# Patient Record
Sex: FEMALE | Race: BLACK OR AFRICAN AMERICAN
[De-identification: names, ages, dates, MRNs, and addresses within clinical notes are randomized per-mention and may not be internally consistent; named-entity substitution may affect disease eponyms.]

---

## 2019-08-26 ENCOUNTER — HOSPITAL ENCOUNTER (OUTPATIENT)
Dept: HOSPITAL 47 - RADUSWWP | Age: 69
Discharge: HOME | End: 2019-08-26
Attending: FAMILY MEDICINE
Payer: MEDICARE

## 2019-08-26 DIAGNOSIS — R60.0: Primary | ICD-10-CM

## 2019-08-26 PROCEDURE — 93970 EXTREMITY STUDY: CPT

## 2019-08-26 NOTE — US
EXAMINATION TYPE: US venous doppler duplex LE 

 

DATE OF EXAM: 8/26/2019 1:43 PM

 

COMPARISON: NONE

 

CLINICAL HISTORY: Bilateral Edema R60.0. Bilateral ankle swelling x many years per patient.

 

SIDE PERFORMED: Bilateral  

 

TECHNIQUE:  The lower extremity deep venous system is examined utilizing real time linear array sonog
waqar with graded compression, doppler sonography and color-flow sonography.

 

VESSELS IMAGED:

 

Common Femoral Vein

Deep Femoral Vein

Greater Saphenous Vein *

Femoral Vein

Popliteal Vein

Small Saphenous Vein *

Proximal Calf Veins

(* superficial vessels)

 

Grayscale, color doppler, spectral doppler imaging performed of the deep veins of the lower extremiti
es.  There is normal flow, compressibility, vascular waveforms.

 

Us exam is technically limited by large body habitus at greater than 250lbs and HT 5'4".

 

Right Leg:  Negative for DVT as technologist was able to visualize vessels

 

Left Leg:  Negative for DVT as technologist was able to visualize vessels

 

Edema channels are noted at bilateral popliteal fossa level and ankle level.

 

IMPRESSION: Limited examination secondary to lower extremity edema and patient body habitus. No gross
 evidence of deep venous thrombosis within the visualized lower extremities.

## 2020-10-12 ENCOUNTER — HOSPITAL ENCOUNTER (OUTPATIENT)
Dept: HOSPITAL 47 - RADMRIMAIN | Age: 70
Discharge: HOME | End: 2020-10-12
Attending: FAMILY MEDICINE
Payer: MEDICARE

## 2020-10-12 DIAGNOSIS — G93.89: Primary | ICD-10-CM

## 2020-10-12 PROCEDURE — 70553 MRI BRAIN STEM W/O & W/DYE: CPT

## 2020-10-12 NOTE — MR
EXAMINATION TYPE: MR brain wo/w con

 

DATE OF EXAM: 10/12/2020

 

COMPARISON: None

 

HISTORY: Tremors, weakness, memory loss

 

TECHNIQUE: 

Multiplanar, multisequence images of the brain and brainstem is performed without and with IV contras
t, utilizing 9.5 mL intravenous Gadavist .

 

FINDINGS: 

Diffusion weighted images demonstrate no evidence of a recent infarct or other diffusion abnormality.
  Moderate nonspecific periventricular and subcortical white matter T2 FLAIR hyperintense foci, which
 demonstrate no evidence of restricted diffusion or postcontrast enhancement. 

 

Mild diffuse volume loss. The ventricular system and cisternal spaces are normal in size and appearan
ce.  There is no extra-axial fluid collection.  

 

Midline structures demonstrate normal morphology.  The craniocervical junction appears within normal 
limits.  Post contrast images demonstrate no abnormal enhancement. The dural venous sinuses appear pa
tent. The visualized sinuses are clear and the globes are grossly symmetric.

 

IMPRESSION: 

1. Mild diffuse volume loss.

2. Moderate nonspecific T2 FLAIR hyperintense foci, likely represent sequela of chronic small vessel 
disease.

## 2022-04-13 ENCOUNTER — HOSPITAL ENCOUNTER (INPATIENT)
Dept: HOSPITAL 47 - EC | Age: 72
LOS: 9 days | Discharge: NURSING, REHAB OR LONG-TERM CARE | DRG: 329 | End: 2022-04-22
Attending: HOSPITALIST | Admitting: HOSPITALIST
Payer: MEDICARE

## 2022-04-13 VITALS — BODY MASS INDEX: 45.7 KG/M2

## 2022-04-13 DIAGNOSIS — E78.5: ICD-10-CM

## 2022-04-13 DIAGNOSIS — I16.0: ICD-10-CM

## 2022-04-13 DIAGNOSIS — K65.8: ICD-10-CM

## 2022-04-13 DIAGNOSIS — Z20.822: ICD-10-CM

## 2022-04-13 DIAGNOSIS — Z87.891: ICD-10-CM

## 2022-04-13 DIAGNOSIS — M19.91: ICD-10-CM

## 2022-04-13 DIAGNOSIS — I48.19: ICD-10-CM

## 2022-04-13 DIAGNOSIS — Z90.710: ICD-10-CM

## 2022-04-13 DIAGNOSIS — K56.7: ICD-10-CM

## 2022-04-13 DIAGNOSIS — J44.9: ICD-10-CM

## 2022-04-13 DIAGNOSIS — Z79.899: ICD-10-CM

## 2022-04-13 DIAGNOSIS — K55.9: ICD-10-CM

## 2022-04-13 DIAGNOSIS — G30.9: ICD-10-CM

## 2022-04-13 DIAGNOSIS — K43.0: Primary | ICD-10-CM

## 2022-04-13 DIAGNOSIS — D72.829: ICD-10-CM

## 2022-04-13 DIAGNOSIS — D62: ICD-10-CM

## 2022-04-13 DIAGNOSIS — E87.6: ICD-10-CM

## 2022-04-13 DIAGNOSIS — F41.9: ICD-10-CM

## 2022-04-13 DIAGNOSIS — Z79.82: ICD-10-CM

## 2022-04-13 DIAGNOSIS — R18.8: ICD-10-CM

## 2022-04-13 DIAGNOSIS — E16.2: ICD-10-CM

## 2022-04-13 DIAGNOSIS — G47.00: ICD-10-CM

## 2022-04-13 DIAGNOSIS — F02.80: ICD-10-CM

## 2022-04-13 DIAGNOSIS — E83.42: ICD-10-CM

## 2022-04-13 DIAGNOSIS — I10: ICD-10-CM

## 2022-04-13 DIAGNOSIS — E66.9: ICD-10-CM

## 2022-04-13 DIAGNOSIS — E87.2: ICD-10-CM

## 2022-04-13 LAB
ALBUMIN SERPL-MCNC: 4.7 G/DL (ref 3.5–5)
ALP SERPL-CCNC: 127 U/L (ref 38–126)
ALT SERPL-CCNC: 16 U/L (ref 4–34)
AMYLASE SERPL-CCNC: 58 U/L (ref 30–110)
ANION GAP SERPL CALC-SCNC: 10 MMOL/L
APTT BLD: 23.5 SEC (ref 22–30)
AST SERPL-CCNC: 32 U/L (ref 14–36)
BASOPHILS # BLD AUTO: 0.1 K/UL (ref 0–0.2)
BASOPHILS NFR BLD AUTO: 1 %
BUN SERPL-SCNC: 14 MG/DL (ref 7–17)
CALCIUM SPEC-MCNC: 9.5 MG/DL (ref 8.4–10.2)
CHLORIDE SERPL-SCNC: 96 MMOL/L (ref 98–107)
CO2 SERPL-SCNC: 30 MMOL/L (ref 22–30)
EOSINOPHIL # BLD AUTO: 0.2 K/UL (ref 0–0.7)
EOSINOPHIL NFR BLD AUTO: 1 %
ERYTHROCYTE [DISTWIDTH] IN BLOOD BY AUTOMATED COUNT: 4.5 M/UL (ref 3.8–5.4)
ERYTHROCYTE [DISTWIDTH] IN BLOOD: 13.4 % (ref 11.5–15.5)
GLUCOSE BLD-MCNC: 128 MG/DL (ref 75–99)
GLUCOSE SERPL-MCNC: 179 MG/DL (ref 74–99)
HCT VFR BLD AUTO: 43 % (ref 34–46)
HGB BLD-MCNC: 13.3 GM/DL (ref 11.4–16)
INR PPP: 1 (ref ?–1.2)
LIPASE SERPL-CCNC: 65 U/L (ref 23–300)
LYMPHOCYTES # SPEC AUTO: 0.7 K/UL (ref 1–4.8)
LYMPHOCYTES NFR SPEC AUTO: 6 %
MCH RBC QN AUTO: 29.6 PG (ref 25–35)
MCHC RBC AUTO-ENTMCNC: 30.9 G/DL (ref 31–37)
MCV RBC AUTO: 95.5 FL (ref 80–100)
MONOCYTES # BLD AUTO: 0.5 K/UL (ref 0–1)
MONOCYTES NFR BLD AUTO: 4 %
NEUTROPHILS # BLD AUTO: 10.8 K/UL (ref 1.3–7.7)
NEUTROPHILS NFR BLD AUTO: 88 %
PH UR: 7.5 [PH] (ref 5–8)
PLATELET # BLD AUTO: 226 K/UL (ref 150–450)
POTASSIUM SERPL-SCNC: 4.4 MMOL/L (ref 3.5–5.1)
PROT SERPL-MCNC: 8.7 G/DL (ref 6.3–8.2)
PROT UR QL: (no result)
PT BLD: 10.6 SEC (ref 9–12)
RBC UR QL: 9 /HPF (ref 0–5)
SODIUM SERPL-SCNC: 136 MMOL/L (ref 137–145)
SP GR UR: 1.01 (ref 1–1.03)
SQUAMOUS UR QL AUTO: <1 /HPF (ref 0–4)
UROBILINOGEN UR QL STRIP: <2 MG/DL (ref ?–2)
WBC # BLD AUTO: 12.3 K/UL (ref 3.8–10.6)
WBC # UR AUTO: <1 /HPF (ref 0–5)

## 2022-04-13 PROCEDURE — 96376 TX/PRO/DX INJ SAME DRUG ADON: CPT

## 2022-04-13 PROCEDURE — 88307 TISSUE EXAM BY PATHOLOGIST: CPT

## 2022-04-13 PROCEDURE — 99285 EMERGENCY DEPT VISIT HI MDM: CPT

## 2022-04-13 PROCEDURE — 82150 ASSAY OF AMYLASE: CPT

## 2022-04-13 PROCEDURE — 83605 ASSAY OF LACTIC ACID: CPT

## 2022-04-13 PROCEDURE — 81001 URINALYSIS AUTO W/SCOPE: CPT

## 2022-04-13 PROCEDURE — 0DB80ZZ EXCISION OF SMALL INTESTINE, OPEN APPROACH: ICD-10-PCS

## 2022-04-13 PROCEDURE — 74022 RADEX COMPL AQT ABD SERIES: CPT

## 2022-04-13 PROCEDURE — 84145 PROCALCITONIN (PCT): CPT

## 2022-04-13 PROCEDURE — 76937 US GUIDE VASCULAR ACCESS: CPT

## 2022-04-13 PROCEDURE — 85025 COMPLETE CBC W/AUTO DIFF WBC: CPT

## 2022-04-13 PROCEDURE — 36410 VNPNXR 3YR/> PHY/QHP DX/THER: CPT

## 2022-04-13 PROCEDURE — 96375 TX/PRO/DX INJ NEW DRUG ADDON: CPT

## 2022-04-13 PROCEDURE — 0WQF0ZZ REPAIR ABDOMINAL WALL, OPEN APPROACH: ICD-10-PCS

## 2022-04-13 PROCEDURE — 74176 CT ABD & PELVIS W/O CONTRAST: CPT

## 2022-04-13 PROCEDURE — 36415 COLL VENOUS BLD VENIPUNCTURE: CPT

## 2022-04-13 PROCEDURE — 96361 HYDRATE IV INFUSION ADD-ON: CPT

## 2022-04-13 PROCEDURE — 83690 ASSAY OF LIPASE: CPT

## 2022-04-13 PROCEDURE — 80053 COMPREHEN METABOLIC PANEL: CPT

## 2022-04-13 PROCEDURE — 83735 ASSAY OF MAGNESIUM: CPT

## 2022-04-13 PROCEDURE — 85730 THROMBOPLASTIN TIME PARTIAL: CPT

## 2022-04-13 PROCEDURE — 74174 CTA ABD&PLVS W/CONTRAST: CPT

## 2022-04-13 PROCEDURE — 85610 PROTHROMBIN TIME: CPT

## 2022-04-13 PROCEDURE — 96365 THER/PROPH/DIAG IV INF INIT: CPT

## 2022-04-13 PROCEDURE — 93306 TTE W/DOPPLER COMPLETE: CPT

## 2022-04-13 PROCEDURE — 84484 ASSAY OF TROPONIN QUANT: CPT

## 2022-04-13 PROCEDURE — 93005 ELECTROCARDIOGRAM TRACING: CPT

## 2022-04-13 PROCEDURE — 0DN80ZZ RELEASE SMALL INTESTINE, OPEN APPROACH: ICD-10-PCS

## 2022-04-13 PROCEDURE — 96366 THER/PROPH/DIAG IV INF ADDON: CPT

## 2022-04-13 PROCEDURE — 85027 COMPLETE CBC AUTOMATED: CPT

## 2022-04-13 PROCEDURE — 74019 RADEX ABDOMEN 2 VIEWS: CPT

## 2022-04-13 PROCEDURE — 80048 BASIC METABOLIC PNL TOTAL CA: CPT

## 2022-04-13 PROCEDURE — 71275 CT ANGIOGRAPHY CHEST: CPT

## 2022-04-13 PROCEDURE — 87635 SARS-COV-2 COVID-19 AMP PRB: CPT

## 2022-04-13 RX ADMIN — HYDROMORPHONE HYDROCHLORIDE ONE MG: 1 INJECTION, SOLUTION INTRAMUSCULAR; INTRAVENOUS; SUBCUTANEOUS at 21:15

## 2022-04-13 RX ADMIN — METOPROLOL TARTRATE SCH MG: 25 TABLET, FILM COATED ORAL at 07:02

## 2022-04-13 RX ADMIN — HYDROMORPHONE HYDROCHLORIDE PRN MG: 1 INJECTION, SOLUTION INTRAMUSCULAR; INTRAVENOUS; SUBCUTANEOUS at 09:21

## 2022-04-13 RX ADMIN — HYDROMORPHONE HYDROCHLORIDE ONE MG: 1 INJECTION, SOLUTION INTRAMUSCULAR; INTRAVENOUS; SUBCUTANEOUS at 21:04

## 2022-04-13 RX ADMIN — HYDROMORPHONE HYDROCHLORIDE PRN MG: 1 INJECTION, SOLUTION INTRAMUSCULAR; INTRAVENOUS; SUBCUTANEOUS at 23:21

## 2022-04-13 RX ADMIN — METOPROLOL TARTRATE SCH: 25 TABLET, FILM COATED ORAL at 23:12

## 2022-04-13 RX ADMIN — MEMANTINE HYDROCHLORIDE SCH MG: 10 TABLET ORAL at 12:33

## 2022-04-13 RX ADMIN — CYANOCOBALAMIN TAB 500 MCG SCH: 500 TAB at 23:00

## 2022-04-13 RX ADMIN — ACETAMINOPHEN SCH: 325 TABLET, FILM COATED ORAL at 23:12

## 2022-04-13 RX ADMIN — LOSARTAN POTASSIUM SCH MG: 50 TABLET, FILM COATED ORAL at 08:55

## 2022-04-13 RX ADMIN — FOLIC ACID SCH: 1 TABLET ORAL at 23:01

## 2022-04-13 RX ADMIN — DICLOFENAC SODIUM SCH: 10 GEL TOPICAL at 23:13

## 2022-04-13 RX ADMIN — DICLOFENAC SODIUM SCH: 10 GEL TOPICAL at 23:10

## 2022-04-13 RX ADMIN — DICLOFENAC SODIUM SCH: 10 GEL TOPICAL at 23:11

## 2022-04-13 RX ADMIN — PRAVASTATIN SODIUM SCH: 20 TABLET ORAL at 23:13

## 2022-04-13 RX ADMIN — CEFAZOLIN SCH: 330 INJECTION, POWDER, FOR SOLUTION INTRAMUSCULAR; INTRAVENOUS at 22:46

## 2022-04-13 RX ADMIN — HEPARIN SODIUM SCH MLS/HR: 10000 INJECTION, SOLUTION INTRAVENOUS at 04:14

## 2022-04-13 RX ADMIN — MEMANTINE HYDROCHLORIDE SCH: 10 TABLET ORAL at 23:01

## 2022-04-13 RX ADMIN — CEFAZOLIN SCH MLS/HR: 330 INJECTION, POWDER, FOR SOLUTION INTRAMUSCULAR; INTRAVENOUS at 05:26

## 2022-04-13 RX ADMIN — HYDROMORPHONE HYDROCHLORIDE ONE MG: 1 INJECTION, SOLUTION INTRAMUSCULAR; INTRAVENOUS; SUBCUTANEOUS at 21:23

## 2022-04-13 RX ADMIN — CEFAZOLIN SCH MLS/HR: 330 INJECTION, POWDER, FOR SOLUTION INTRAMUSCULAR; INTRAVENOUS at 23:23

## 2022-04-13 NOTE — P.OP
Date of Procedure: 04/13/22


Preoperative Diagnosis: 


Small bowel obstruction


Postoperative Diagnosis: 


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


Procedure(s) Performed: 


Exploratory laparotomy





Small bowel resection.  





Repair of incisional hernia


Anesthesia: TRINA


Surgeon: Melquiades Vela


Estimated Blood Loss (ml): 25


Pathology: other (Small bowel)


Condition: critical


Disposition: PACU


Description of Procedure: 


The patient's placed the operative table in the supine position her she received

a general anesthetic.  Her abdomen was prepped and draped in sterile fashion.  

The abdomen was entered through the midline.  Upon entering the pleural cavity. 

There was approximately 1000 mL of sanguinous ascites.  The incision was 

extended inferiorly.  There was a small incisional hernia seen.  The small bowel

was examined.  There appeared to be evidence of a internal hernia with straining

of small bowel.  An adhesive band was found freeing the hernia.  This was lysed 

with sharp dissection.  And the small bowel was released.  The small bowel was 

black.  The small bowel was then transected proximally distally.  With a GI 

stapler.  Then using the Enseal device the mesentery the bowel was divided.  The

specimens of pathology.  A side-to-side functional end-to-end staple anastomosis

was then created using TEJAL and TA stapler.  A 3-0 GI silk sutures across stitch.

 The abdomen was then irrigated with 4 L normal saline.  No bleeding was seen.  

The fascia was closed with looped #1 PDS suture.  2 sutures used to close the 

fascia.  The incisional hernias repaired, fascial closure.  The skin was closed 

staples.  Several Telfa fermin were placed in the subcutaneous tissue.  Patient 

was sent to recovery room in critical condition.

## 2022-04-13 NOTE — XR
EXAMINATION TYPE: XR abdomen 2V

 

DATE OF EXAM: 4/13/2022

 

COMPARISON: 7/2/2010

 

HISTORY: Pain

 

TECHNIQUE: One view abdominal series

 

FINDINGS:  

Bibasilar subsegmental consolidation. Hypertrophic and degenerative change of the spine. Dilated smal
l bowel loops are seen and there is contrast within the right renal collecting system. Contrast in th
e pelvis likely within the bladder. Osteitis pubis condensans.

 

IMPRESSION:

1. Nonspecific abdomen with multiple of prominent small bowel loops seen in the left abdomen. Could b
e on the basis of ileus, enteritis or partial structures.

2. Markedly distended stomach correlate for gastroparesis or gastric outlet obstruction.

3. Bibasilar subsegmental atelectasis or early infiltrate

## 2022-04-13 NOTE — ECHOF
Referral Reason:



MEASUREMENTS

--------

HEIGHT: 162.6 cm

WEIGHT: 104.3 kg

BP: 

IVSd:   1.2 cm     (0.6 - 1.1)

LVIDd:   4.9 cm     (3.9 - 5.3)

LVPWd:   1.1 cm     (0.6 - 1.1)

IVSs:   1.4 cm

LVIDs:   3.1 cm

LVPWs:   1.2 cm

Ao Diam:   3.3 cm     (2.0 - 3.7)

AV Cusp:   2.1 cm     (1.5 - 2.6)

LA Diam:   2.6 cm     (2.7 - 3.8)

MV EXCURSION:   17.007 mm     (> 18.000)

MV EF SLOPE:   78 mm/s     (70 - 150)

MV E Ja:   0.53 m/s

MV DecT:   210 ms

MV A Ja:   1.15 m/s

MV E/A Ratio:   0.46 

RAP:   5.00 mmHg

RVSP:   12.42 mmHg







FINDINGS

--------

This was a technically difficult study with suboptimal views.

The left ventricular size is normal.   There is mild concentric left ventricular hypertrophy.   Overa
ll left ventricular systolic function is normal with, an EF between 55 - 60 %.

The RV was not well visualized.

The left atrial size is normal.

The right atrium was not well visualized.

xx ml of Lumason was utilized for enhancement of images.

The aortic valve was not well visualized.

There is trace mitral regurgitation.

The tricuspid valve appears structurally normal.   Trace tricuspid regurgitation present.   Right marli
tricular systolic pressure is normal at < 35 mmHg.

There is no pulmonic regurgitation present.

The aortic root size is normal.

IVC Not well visulized.



CONCLUSIONS

--------

1. The left ventricular size is normal.

2. There is mild concentric left ventricular hypertrophy.

3. Overall left ventricular systolic function is normal with, an EF between 55 - 60 %.

4. There is trace mitral regurgitation.

5. Trace tricuspid regurgitation present.





SONOGRAPHER: Juliane Chase, [facility]

## 2022-04-13 NOTE — CT
EXAMINATION TYPE: CT abdomen pelvis wo con

 

DATE OF EXAM: 4/13/2022

 

COMPARISON: CT dated 4/13/2022

 

HISTORY: Bowel obstruction

 

CT DLP: 1418.2 mGycm

Automated exposure control for dose reduction was used.

 

TECHNIQUE:  Helical acquisition of images was performed from the lung bases through the pelvis.

 

FINDINGS: 

 

LUNG BASES: Minimal atelectasis. Cardiomegaly.

 

LIVER/GB: Suboptimally assessed without gross abnormality.

 

PANCREAS: No significant abnormality is seen.

 

SPLEEN: No significant abnormality is seen.

 

ADRENALS: No significant abnormality is seen.

 

KIDNEYS: Contrast excretion is seen bilaterally likely from the recent abdominal enhanced scan. Unrem
arkable kidneys otherwise.

 

FREE AIR:  No free air is visualized

 

RETROPERITONEAL ADENOPATHY:  None visualized

 

REPRODUCTIVE ORGANS: Previous hysterectomy. No gross adnexal mass.

 

URINARY BLADDER:  No significant abnormality is seen.

 

PELVIC ADENOPATHY:  None visualized.

 

OSSEOUS STRUCTURES:  Anterior wedging and compression fracture of L1 vertebral body, likely chronic w
ith spinal canal stenosis at that level.

 

BOWEL:  Redemonstration of the focally dilated small bowel loops in the lower abdomen measuring up to
 3.5 cm with apparent thickened wall, adjacent mesenteric fat stranding, edema and congested mesenter
ic vessels with severely reduced caliber of the small bowel proximally and distally, highly suggestiv
e of a closed loop obstruction. The small bowel proximally and distally to the dilated loops are norm
al in caliber. The underlying etiology could be related to severe focal adhesion, abnormal bowel rota
tion or internal hernia. Bowel ischemia cannot be excluded. Recommend urgent surgical consultation. S
cattered uncomplicated colonic diverticulosis. No evidence of colonic obstruction. Lipoma is seen at 
the gastroduodenal junction measuring 14 mm.

 

OTHER: Arterial atherosclerotic calcifications. Free fluid is seen in the pelvis and around the liver
.

 

IMPRESSION: 

PERSISTENT FOCAL DILATATION OF THE SMALL BOWEL LOOPS IN THE LOWER ABDOMEN AS DETAILED ABOVE WITH FIND
INGS HIGHLY SUGGESTIVE OF A CLOSED LOOP OBSTRUCTION. THE UNDERLYING ETIOLOGY COULD BE RELATED TO ADHE
SIONS HOWEVER INTERNAL HERNIA OR ABNORMAL BOWEL ROTATION CANNOT BE EXCLUDED. SUSPECTED EARLY BOWEL IS
CHEMIA. NO LYNNETTE PNEUMATOSIS OR FREE PERITONEAL AIR. RECOMMEND URGENT SURGICAL CONSULTATION. OTHER FI
NDINGS AS DETAILED ABOVE.

## 2022-04-13 NOTE — XR
EXAMINATION TYPE: XR abdomen acute w cxr

 

DATE OF EXAM: 4/13/2022

 

COMPARISON: July 2, 2010

 

HISTORY: Abdominal pain

 

TECHNIQUE: 4 views

 

FINDINGS: There is no heart failure nor confluent pneumonic infiltrate. Bowel gas pattern is fairly n
ormal. No sign of intestinal obstruction or pneumoperitoneum. Fecal pattern is normal. There is no ev
idence of a mass. Heart size is normal. No hilar mass. There is some small linear density left lung b
ase.

 

IMPRESSION: Minimal atelectasis at the left lung base appears new compared to old exam. Nonacute abdo
men.

## 2022-04-13 NOTE — P.CRDCN
History of Present Illness


History of present illness: 


This is 71 year old female with a past medical history of dementia, COPD, 

hypertension, dyslipidemia, chronic LE edema, former smoker. She does not follow

with cardiologist. No known history of cardiac disease. We are consulted for 

atrial fibrillation. She was brought to the ER from [facility] for worsening upper 

abdominal pain. Patient is alert and oriented to person. She states she is at [facility] and states its the year 2021. She is unable to describe why she was 

brought to the hospital. But she does complain of abdominal pain and has 

tenderness to palpation to the upper abdomen. States her abdomen pain is sharp. 

Patient with no known history of cardiac disease, atrial fibrillation, stroke, 

MI, or CAD. On admission, an EKG was performed and patient was found to be in 

atrial fibrillation with controlled ventricular rates.  She was also found to be

hypertensive with /95. She was given a total of 1.25mg IV Vasotec, 40mg IV

Labetolol, IV morphine, IV Zofran and IV dilaudid. She did convert to sinus 

mechanism 





DIAGNOSTICS


* EKG reveals atrial fibrillation, heart rate 89.


* Repeat EKG revealed sinus rhythm, heart rate 83, early repolarization in 

  inferior lateral leads, poor R wave progression, LVH


* Telemetry tracings indicate sinus mechanism heart rates in the 60s80s.


* Thoracic CT revealed no evidence of pulmonary embolism, no evidence of acute 

  aneurysm or dissection.  No evidence of hemodynamic stenosis.  Moderate 

  dilated fluid-filled loop of small bowel in the pelvis, could relate to 

  mechanical bowel obstruction versus severe ileus.


* echocardiogram revealed EF 5560 percent, mild concentric LVH


* Laboratory reviewed,troponin negative, lactate 3.3, repeat 4.7, WBC 12.3, 

  hemoglobin 13.3, platelets 226, sodium 136, potassium 4.4, BUN 14, serum 

  creatinine 0.8


* Current home cardiac medications include prednisone 20 mg nightly, Lasix 60 mg

  daily, aspirin 325 mg daily, metoprolol titrate 25 mg twice a day





REVIEW OF SYSTEMS


At the time of my exam:


CONSTITUTIONAL: Denies fever or chills.


CARDIOVASCULAR: Denies chest pain, shortness of breath, orthopnea, PND or pa

lpitations.


RESPIRATORY: Denies cough. 


GASTROINTESTINAL: Denies abdominal pain, diarrhea, constipation, nausea or 

vomiting.


MUSCULOSKELETAL: Denies myalgias.


NEUROLOGIC: Denies numbness, tingling, headacbe or weakness.


ENDOCRINE: Denies fatigue, weight change,  polydipsia or polyurina.


GENITOURINARY: Denies burning, hematuria or urgency with micturation.


HEMATOLOGIC: Denies history of anemia or bleeding. 





PHYSICAL EXAMINATION


Blood pressure 171/99, heart rate 80s, afebrile, saturation is 98% on room air


CONSTITUTIONAL: No apparent distress. 


HEENT: Head is normocephalic. Pupils are equal, round. Sclerae anicteric. Mucous

membranes of the mouth are moist.  No JVD. No carotid bruit.


CHEST EXAMINATION: Lungs are clear to auscultation. No chest wall tenderness is 

noted on palpation or with deep breathing. 


HEART EXAMINATION: Regular rate and rhythm. S1, S2 heard. No murmurs, gallops or

rub.


ABDOMEN: Distended. Tender to palpation to upper and lower left and right 

quadrants  Positive bowel sounds.


EXTREMITIES: 2+ peripheral pulses, Moderate chronic lower extremity edema with 

skin coloration changes 


NEUROLOGIC EXAMINATION: Patient is awake, alert and oriented x3. 





ASSESSMENT


Paroxysmal atrial fibrillation, [id number] score 3


Abdominal pain, concerning for mechanical bowel obstructive vs ileus see on CT 


Hypertensive Urgency 


Dementia


Dyslipidemia


Chronic lower extremity edema





PLAN


Patient will require long-term anticoagulation.  We will continue IV heparin 

drip until evaluation by surgery


Start amlodipine 5 mg twice a day


Start losartan 50 mg daily


Continue metoprolol tartrate 25mg BID 


Further recommendations based on clinical course





Nurse practitioner note has been reviewed by physician. Signing provider agrees 

with the documented findings, assessment, and plan of care. 











Past Medical History


Past Medical History: COPD, Hyperlipidemia, Hypertension


History of Any Multi-Drug Resistant Organisms: None Reported


Past Surgical History: Hysterectomy


Past Psychological History: No Psychological Hx Reported


Smoking Status: Former smoker


Past Alcohol Use History: None Reported


Past Drug Use History: None Reported





Medications and Allergies


                                Home Medications











 Medication  Instructions  Recorded  Confirmed  Type


 


Acetaminophen Tab [Tylenol] 650 mg PO BID@0800,1700 04/13/22 04/13/22 History


 


Acetaminophen [Tylenol 8 Hour] 650 mg PO Q4H PRN 04/13/22 04/13/22 History


 


Acetaminophen-Codeine 300-30mg 1 tab PO Q12H PRN 04/13/22 04/13/22 History





[Tylenol w/codeine #3]    


 


Aspirin 325 mg PO DAILY@1700 04/13/22 04/13/22 History


 


Cyanocobalamin [Vitamin B-12] 500 mcg PO DAILY@1700 04/13/22 04/13/22 History


 


Diclofenac Sodium Gel [Voltaren 2 gm TOPICAL QID 04/13/22 04/13/22 History





Gel]    


 


Ergocalciferol [Vitamin D2 (1250 1,250 mcg PO SANTORO@1700 04/13/22 04/13/22 History





Mcg = 98837 Iu)]    


 


Folic Acid 1 mg PO DAILY@1700 04/13/22 04/13/22 History


 


Furosemide [Lasix] 60 mg PO DAILY@1700 04/13/22 04/13/22 History


 


Magnesium Hydroxide [Milk of 7,200 mg PO DAILY PRN 04/13/22 04/13/22 History





Magnesia Concentrate]    


 


Memantine [Namenda] 10 mg PO BID@0800,1700 04/13/22 04/13/22 History


 


Metoprolol Tartrate [Lopressor] 25 mg PO BID@0800,1700 04/13/22 04/13/22 History


 


Na Phos,M-B/Na Phos,Di-Ba [Fleet 133 ml RECTAL DAILY PRN 04/13/22 04/13/22 

History





Adult]    


 


Potassium Chloride [Klor-Con 10 ER] 10 meq PO DAILY@0800 04/13/22 04/13/22 

History


 


Pravastatin Sodium [Pravachol] 20 mg PO HS@2100 04/13/22 04/13/22 History


 


Ubidecarenone [Co Q-10] 100 mg PO DAILY@1700 04/13/22 04/13/22 History


 


bisacodyL [Dulcolax] 10 mg RECTAL DAILY PRN 04/13/22 04/13/22 History








                                    Allergies











Allergy/AdvReac Type Severity Reaction Status Date / Time


 


doxycycline [From Vibramycin] Allergy  Unknown Verified 04/13/22 06:25


 


niacin Allergy  Unknown Verified 04/13/22 06:25


 


simvastatin [From Zocor] Allergy  Unknown Verified 04/13/22 06:25














Physical Exam


Vitals: 


                                   Vital Signs











  Temp Pulse Resp BP Pulse Ox


 


 04/13/22 06:48   104 H   171/99  98


 


 04/13/22 06:08   77  18  203/102  98


 


 04/13/22 04:36   86  16  205/106  98


 


 04/13/22 03:24   88  18  194/103  98


 


 04/13/22 01:45  98.3 F  61  18  215/95  98








                                Intake and Output











 04/12/22 04/13/22 04/13/22





 22:59 06:59 14:59


 


Other:   


 


  Weight  104.326 kg 














Results





                                 04/13/22 02:13





                                 04/13/22 02:13


                                 Cardiac Enzymes











  04/13/22 04/13/22 Range/Units





  02:13 02:13 


 


AST  32   (14-36)  U/L


 


Troponin I   0.015  (0.000-0.034)  ng/mL








                                   Coagulation











  04/13/22 Range/Units





  02:13 


 


PT  10.6  (9.0-12.0)  sec


 


APTT  23.5  (22.0-30.0)  sec








                                       CBC











  04/13/22 Range/Units





  02:13 


 


WBC  12.3 H  (3.8-10.6)  k/uL


 


RBC  4.50  (3.80-5.40)  m/uL


 


Hgb  13.3  (11.4-16.0)  gm/dL


 


Hct  43.0  (34.0-46.0)  %


 


Plt Count  226  (150-450)  k/uL








                          Comprehensive Metabolic Panel











  04/13/22 Range/Units





  02:13 


 


Sodium  136 L  (137-145)  mmol/L


 


Potassium  4.4  (3.5-5.1)  mmol/L


 


Chloride  96 L  ()  mmol/L


 


Carbon Dioxide  30  (22-30)  mmol/L


 


BUN  14  (7-17)  mg/dL


 


Creatinine  0.80  (0.52-1.04)  mg/dL


 


Glucose  179 H  (74-99)  mg/dL


 


Calcium  9.5  (8.4-10.2)  mg/dL


 


AST  32  (14-36)  U/L


 


ALT  16  (4-34)  U/L


 


Alkaline Phosphatase  127 H  ()  U/L


 


Total Protein  8.7 H  (6.3-8.2)  g/dL


 


Albumin  4.7  (3.5-5.0)  g/dL








                               Current Medications











Generic Name Dose Route Start Last Admin





  Trade Name Freq  PRN Reason Stop Dose Admin


 


Heparin Sodium (Porcine)  0 unit  04/13/22 02:34 





  Heparin Sodium 1,000 Un/Ml (10ml Vl)  IV  





  PER PROTOCOL PRN  





  Low PTT  





  Protocol  


 


Hydromorphone HCl  1 mg  04/13/22 06:13 





  Hydromorphone 1 Mg/Ml 1 Ml Syringe  IVP  





  Q3HR PRN  





  Severe Pain  


 


Heparin Sodium/Sodium Chloride  250 mls @ 10.036 mls/hr  04/13/22 02:45  

04/13/22 04:14





  25,000 unit/ Sodium Chloride  IV   9.62 units/kg/hr





  .Q24H JACQUI   10.036 mls/hr





    Administration





  Protocol  





  9.62 UNITS/KG/HR  


 


Sodium Chloride  1,000 mls @ 130 mls/hr  04/13/22 03:45  04/13/22 05:26





  Saline 0.9%  IV   130 mls/hr





  .Q7H42M JACQUI   Administration


 


Metoprolol Tartrate  25 mg  04/13/22 06:30  04/13/22 07:02





  Metoprolol Tartrate 25 Mg Tab  PO   25 mg





  Q12H JACQUI   Administration


 


Naloxone HCl  0.2 mg  04/13/22 06:13 





  Naloxone 0.4 Mg/Ml 1 Ml Vial  IV  





  Q2M PRN  





  Opioid Reversal  


 


Ondansetron HCl  4 mg  04/13/22 06:13 





  Ondansetron 4 Mg/2 Ml Vial  IVP  





  Q8HR PRN  





  Nausea And Vomiting  


 


Pantoprazole Sodium  40 mg  04/13/22 09:00 





  Pantoprazole 40 Mg/10 Ml Vial  IV  





  DAILY JACQUI  








                                Intake and Output











 04/12/22 04/13/22 04/13/22





 22:59 06:59 14:59


 


Other:   


 


  Weight  104.326 kg 








                                        





                                 04/13/22 02:13 





                                 04/13/22 02:13

## 2022-04-13 NOTE — CT
EXAMINATION TYPE: CT angio thor/abd pel aorta

 

DATE OF EXAM: 4/13/2022

 

COMPARISON: None

 

HISTORY: sudden onset of epigastric pain.

 

CT DLP: 1716.6 mGycm

Automated exposure control for dose reduction was used.

 

CONTRAST: 

Performed with IV Contrast, patient injected with 100 mL of Isovue 370.

 

 

Images obtained from the thoracic inlet to the floor the pelvis without and with IV contrast. There i
s 3-D post processed images.

 

The lungs are clear of consolidation. There is some mild subsegmental atelectasis in the lower lung f
ields. Heart is enlarged. Thoracic aorta is intact. No dissection. The ascending aorta measures 3.7 c
m. No aneurysm. There is no evidence of filling defect in the pulmonary arteries. There is no pleural
 effusion. No pericardial effusion.

 

Liver spleen and stomach pancreas appear intact. Gallbladder appears intact. The bile ducts are not d
ilated. There is probably a cholesterol 1 cm gallstone.

 

There is no adrenal mass. Kidneys show satisfactory contrast opacification. There is no hydronephrosi
s. Ureters are not dilated.

 

There is arterial flow in the abdominal aorta. There is arterial flow in the celiac artery and superi
or mesenteric artery. No stenosis. There is arterial flow in the renal arteries. There is arterial fl
ow in the iliac arteries. No evidence of stenosis. No arterial aneurysm or dissection. Minimal athero
matous changes are seen.

 

There is a dilated fluid-filled loop of small bowel in the pelvis that measures up to almost 4 cm. Th
e terminal ileum is not dilated.

 

There is L1 compression fracture with 50% anterior wedging.

 

IMPRESSION:

No evidence of pulmonary embolism. No evidence of arterial aneurysm or dissection. No evidence of hem
odynamic stenosis.

 

Moderately dilated fluid-filled loop of small bowel in the pelvis. This could relate to mechanical chance
wel obstruction or severe localized ileus. Follow-up is recommended. Dilation appears new compared to
 old exam.

 

There is some mild atelectasis in the lower lung fields.

## 2022-04-13 NOTE — ED
Abdominal Pain HPI





- General


Chief Complaint: Abdominal Pain


Stated Complaint: Abd Pain


Time Seen by Provider: 04/13/22 01:33


Source: EMS, RN notes reviewed


Mode of arrival: EMS





- History of Present Illness


Initial Comments: 





This is a pleasant 71-year-old female with history of hypertension who states 

that she developed rather severe abdominal pain about 6 hours prior to arrival. 

She states this was across her upper abdomen.  She states it was sharp in 

nature, constant, no alleviating or exacerbating factors.  Patient states that 

she skipped dinner prior to onset of this pain because she didn't feel like 

eating.  She denies any chest pain or shortness of breath.  This does not 

radiate.  She has never had any symptoms such as this previously.





No headache, no fever or chills, no changes in vision or hearing, no sore throat

or difficulty with speech, no neck pain, no chest pain or shortness of breath, 

no nausea or vomiting, no changes in urination or bowel movements, no numbness 

or tingling, no extremity pain, no skin rashes or lesions.


MD Complaint: abdominal pain





- Related Data


                                Home Medications











 Medication  Instructions  Recorded  Confirmed


 


Acetaminophen Tab [Tylenol] 650 mg PO BID@0800,1700 04/13/22 04/13/22


 


Acetaminophen [Tylenol 8 Hour] 650 mg PO Q4H PRN 04/13/22 04/13/22


 


Acetaminophen-Codeine 300-30mg 1 tab PO Q12H PRN 04/13/22 04/13/22





[Tylenol w/codeine #3]   


 


Aspirin 325 mg PO DAILY@1700 04/13/22 04/13/22


 


Cyanocobalamin [Vitamin B-12] 500 mcg PO DAILY@1700 04/13/22 04/13/22


 


Diclofenac Sodium Gel [Voltaren 2 gm TOPICAL QID 04/13/22 04/13/22





Gel]   


 


Ergocalciferol [Vitamin D2 (1250 1,250 mcg PO SANTORO@1700 04/13/22 04/13/22





Mcg = 23251 Iu)]   


 


Folic Acid 1 mg PO DAILY@1700 04/13/22 04/13/22


 


Furosemide [Lasix] 60 mg PO DAILY@1700 04/13/22 04/13/22


 


Magnesium Hydroxide [Milk of 7,200 mg PO DAILY PRN 04/13/22 04/13/22





Magnesia Concentrate]   


 


Memantine [Namenda] 10 mg PO BID@0800,1700 04/13/22 04/13/22


 


Metoprolol Tartrate [Lopressor] 25 mg PO BID@0800,1700 04/13/22 04/13/22


 


Na Phos,M-B/Na Phos,Di-Ba [Fleet 133 ml RECTAL DAILY PRN 04/13/22 04/13/22





Adult]   


 


Potassium Chloride [Klor-Con 10 ER] 10 meq PO DAILY@0800 04/13/22 04/13/22


 


Pravastatin Sodium [Pravachol] 20 mg PO HS@2100 04/13/22 04/13/22


 


Ubidecarenone [Co Q-10] 100 mg PO DAILY@1700 04/13/22 04/13/22


 


bisacodyL [Dulcolax] 10 mg RECTAL DAILY PRN 04/13/22 04/13/22











                                    Allergies











Allergy/AdvReac Type Severity Reaction Status Date / Time


 


doxycycline [From Vibramycin] Allergy  Unknown Verified 04/13/22 06:25


 


niacin Allergy  Unknown Verified 04/13/22 06:25


 


simvastatin [From Zocor] Allergy  Unknown Verified 04/13/22 06:25














Review of Systems


ROS Statement: 


Those systems with pertinent positive or pertinent negative responses have been 

documented in the HPI.





ROS Other: All systems not noted in ROS Statement are negative.





Past Medical History


Past Medical History: COPD, Hyperlipidemia, Hypertension


History of Any Multi-Drug Resistant Organisms: None Reported


Past Surgical History: Hysterectomy


Past Psychological History: No Psychological Hx Reported


Smoking Status: Former smoker


Past Alcohol Use History: None Reported


Past Drug Use History: None Reported





- Past Family History


  ** Mother


History Unknown: Yes


Additional Family Medical History / Comment(s): Pt was adopted.





  ** Father


History Unknown: Yes


Additional Family Medical History / Comment(s): Pt was adopted.





General Exam





- General Exam Comments


Initial Comments: 





Obese 71-year-old female in no specific distress at the time I'm seeing her.  

Vital signs reviewed.  Patient appears to be adequately hydrated.  Cranial 

nerves II through XII grossly intact.  Does not appear to be ill or toxic.


General appearance: alert, in distress


Head exam: Present: atraumatic, normocephalic, normal inspection


Eye exam: Present: normal appearance, PERRL, EOMI.  Absent: scleral icterus, 

conjunctival injection, periorbital swelling


ENT exam: Present: normal exam, normal oropharynx, mucous membranes moist


Neck exam: Present: normal inspection.  Absent: tenderness, meningismus, 

lymphadenopathy


Respiratory exam: Present: normal lung sounds bilaterally.  Absent: respiratory 

distress, wheezes, rales, rhonchi, stridor


Cardiovascular Exam: Present: regular rate, normal rhythm, normal heart sounds. 

Absent: systolic murmur, diastolic murmur, rubs, gallop, clicks


GI/Abdominal exam: Present: soft, tenderness (Epigastrium), guarding 

(Involuntary), normal bowel sounds, other (Patient has tenderness to palpation 

epigastrium.  No specific tenderness elsewhere.  No pulsatile mass).  Absent: 

distended, rebound, rigid, diminished bowel sounds, hyperactive bowel sounds, 

hypoactive bowel sounds, organomegaly, mass, bruit, pulsatile mass, hernia


Extremities exam: Present: normal inspection, full ROM, normal capillary refill.

 Absent: tenderness, pedal edema, joint swelling, calf tenderness


Back exam: Present: normal inspection


Neurological exam: Present: alert, oriented X3, CN II-XII intact


Psychiatric exam: Present: normal affect, normal mood


Skin exam: Present: warm, dry, intact, normal color.  Absent: rash





Course


                                   Vital Signs











  04/13/22 04/13/22 04/13/22





  01:45 03:24 04:36


 


Temperature 98.3 F  


 


Pulse Rate 61 88 86


 


Respiratory 18 18 16





Rate   


 


Blood Pressure 215/95 194/103 205/106


 


O2 Sat by Pulse 98 98 98





Oximetry   














  04/13/22 04/13/22 04/13/22





  06:08 06:48 10:08


 


Temperature   


 


Pulse Rate 77 104 H 88


 


Respiratory 18  16





Rate   


 


Blood Pressure 203/102 171/99 173/98


 


O2 Sat by Pulse 98 98 97





Oximetry   














  04/13/22 04/13/22





  11:32 18:31


 


Temperature  


 


Pulse Rate 92 112 H


 


Respiratory 20 16





Rate  


 


Blood Pressure 178/106 122/71


 


O2 Sat by Pulse 95 92 L





Oximetry  














- Reevaluation(s)


Reevaluation #1: 





04/13/22 02:43


Patient found to have uncontrolled hypertension, new onset atrial fibrillation. 

Labetalol ordered.  We'll plan on reevaluation.


Patient's pain is improving.


No chest pain.


Again, tenderness in the epigastrium.





Medical Decision Making





- Medical Decision Making





Computed tomography scan of chest and pelvis shows good arterial flow.  Does 

show dilated loops of bowel consistent with a ileus or mechanical bowel 

obstruction.  However the patient is not having any vomiting.  Lactic acid did 

come back at 3.5.  We'll order a repeat.  Patient has had a 1 L fluid bolus.  

Patient's blood pressure down to 195/103 after labetalol.  We'll repeat.  

Patient also found to have new onset atrial fibrillation.





The case was discussed in detail with ED attending physician.  Presentation, 

findings, treatment plan discussed in detail.








Given the patient's workup findings, patient has atrial fibrillation which is 

new onset by history.  However I'm unsure when this actually started.  Will 

start heparin.  Patient also has an ileus or dilated bowel loop in the lower 

abdomen.  However has no vomiting or nausea.  There was no evidence of poor 

arterial flow on CT abdomen and pelvis.  Patient's lactate was 3.5.  Note that 

the patient did skip tear because she states she felt like not eating.  

Certainly this could be related to hydration status.  We'll repeat.





Patient does not appear to have pathology consistent with infectious process or 

sepsis.





Case endorsed to Dr. Mckeon at 3:40 AM for further evaluation and disposition.  

Currently awaiting urinalysis.





- Lab Data


Result diagrams: 


                                 04/13/22 02:13





                                 04/13/22 02:13


                                   Lab Results











  04/13/22 04/13/22 04/13/22 Range/Units





  02:13 02:13 02:13 


 


WBC  12.3 H    (3.8-10.6)  k/uL


 


RBC  4.50    (3.80-5.40)  m/uL


 


Hgb  13.3    (11.4-16.0)  gm/dL


 


Hct  43.0    (34.0-46.0)  %


 


MCV  95.5    (80.0-100.0)  fL


 


MCH  29.6    (25.0-35.0)  pg


 


MCHC  30.9 L    (31.0-37.0)  g/dL


 


RDW  13.4    (11.5-15.5)  %


 


Plt Count  226    (150-450)  k/uL


 


MPV  9.1    


 


Neutrophils %  88    %


 


Lymphocytes %  6    %


 


Monocytes %  4    %


 


Eosinophils %  1    %


 


Basophils %  1    %


 


Neutrophils #  10.8 H    (1.3-7.7)  k/uL


 


Lymphocytes #  0.7 L    (1.0-4.8)  k/uL


 


Monocytes #  0.5    (0-1.0)  k/uL


 


Eosinophils #  0.2    (0-0.7)  k/uL


 


Basophils #  0.1    (0-0.2)  k/uL


 


PT     (9.0-12.0)  sec


 


INR     (<1.2)  


 


APTT     (22.0-30.0)  sec


 


Sodium   136 L   (137-145)  mmol/L


 


Potassium   4.4   (3.5-5.1)  mmol/L


 


Chloride   96 L   ()  mmol/L


 


Carbon Dioxide   30   (22-30)  mmol/L


 


Anion Gap   10   mmol/L


 


BUN   14   (7-17)  mg/dL


 


Creatinine   0.80   (0.52-1.04)  mg/dL


 


Est GFR (CKD-EPI)AfAm   87   (>60 ml/min/1.73 sqM)  


 


Est GFR (CKD-EPI)NonAf   75   (>60 ml/min/1.73 sqM)  


 


Glucose   179 H   (74-99)  mg/dL


 


Lactic Ac Sepsis Rflx     


 


Plasma Lactic Acid Levi     (0.7-2.0)  mmol/L


 


Calcium   9.5   (8.4-10.2)  mg/dL


 


Total Bilirubin   1.2   (0.2-1.3)  mg/dL


 


AST   32   (14-36)  U/L


 


ALT   16   (4-34)  U/L


 


Alkaline Phosphatase   127 H   ()  U/L


 


Troponin I    0.015  (0.000-0.034)  ng/mL


 


Total Protein   8.7 H   (6.3-8.2)  g/dL


 


Albumin   4.7   (3.5-5.0)  g/dL


 


Amylase   58   ()  U/L


 


Lipase   65   ()  U/L


 


Urine Color     


 


Urine Appearance     (Clear)  


 


Urine pH     (5.0-8.0)  


 


Ur Specific Gravity     (1.001-1.035)  


 


Urine Protein     (Negative)  


 


Urine Glucose (UA)     (Negative)  


 


Urine Ketones     (Negative)  


 


Urine Blood     (Negative)  


 


Urine Nitrite     (Negative)  


 


Urine Bilirubin     (Negative)  


 


Urine Urobilinogen     (<2.0)  mg/dL


 


Ur Leukocyte Esterase     (Negative)  


 


Urine RBC     (0-5)  /hpf


 


Urine WBC     (0-5)  /hpf


 


Ur Squamous Epith Cells     (0-4)  /hpf


 


Urine Mucus     (None)  /hpf














  04/13/22 04/13/22 04/13/22 Range/Units





  02:13 02:38 02:50 


 


WBC     (3.8-10.6)  k/uL


 


RBC     (3.80-5.40)  m/uL


 


Hgb     (11.4-16.0)  gm/dL


 


Hct     (34.0-46.0)  %


 


MCV     (80.0-100.0)  fL


 


MCH     (25.0-35.0)  pg


 


MCHC     (31.0-37.0)  g/dL


 


RDW     (11.5-15.5)  %


 


Plt Count     (150-450)  k/uL


 


MPV     


 


Neutrophils %     %


 


Lymphocytes %     %


 


Monocytes %     %


 


Eosinophils %     %


 


Basophils %     %


 


Neutrophils #     (1.3-7.7)  k/uL


 


Lymphocytes #     (1.0-4.8)  k/uL


 


Monocytes #     (0-1.0)  k/uL


 


Eosinophils #     (0-0.7)  k/uL


 


Basophils #     (0-0.2)  k/uL


 


PT  10.6    (9.0-12.0)  sec


 


INR  1.0    (<1.2)  


 


APTT  23.5    (22.0-30.0)  sec


 


Sodium     (137-145)  mmol/L


 


Potassium     (3.5-5.1)  mmol/L


 


Chloride     ()  mmol/L


 


Carbon Dioxide     (22-30)  mmol/L


 


Anion Gap     mmol/L


 


BUN     (7-17)  mg/dL


 


Creatinine     (0.52-1.04)  mg/dL


 


Est GFR (CKD-EPI)AfAm     (>60 ml/min/1.73 sqM)  


 


Est GFR (CKD-EPI)NonAf     (>60 ml/min/1.73 sqM)  


 


Glucose     (74-99)  mg/dL


 


Lactic Ac Sepsis Rflx     


 


Plasma Lactic Acid Levi   3.3 H*   (0.7-2.0)  mmol/L


 


Calcium     (8.4-10.2)  mg/dL


 


Total Bilirubin     (0.2-1.3)  mg/dL


 


AST     (14-36)  U/L


 


ALT     (4-34)  U/L


 


Alkaline Phosphatase     ()  U/L


 


Troponin I     (0.000-0.034)  ng/mL


 


Total Protein     (6.3-8.2)  g/dL


 


Albumin     (3.5-5.0)  g/dL


 


Amylase     ()  U/L


 


Lipase     ()  U/L


 


Urine Color    Light Yellow  


 


Urine Appearance    Clear  (Clear)  


 


Urine pH    7.5  (5.0-8.0)  


 


Ur Specific Gravity    1.008  (1.001-1.035)  


 


Urine Protein    1+ H  (Negative)  


 


Urine Glucose (UA)    Trace H  (Negative)  


 


Urine Ketones    Negative  (Negative)  


 


Urine Blood    Trace H  (Negative)  


 


Urine Nitrite    Negative  (Negative)  


 


Urine Bilirubin    Negative  (Negative)  


 


Urine Urobilinogen    <2.0  (<2.0)  mg/dL


 


Ur Leukocyte Esterase    Negative  (Negative)  


 


Urine RBC    9 H  (0-5)  /hpf


 


Urine WBC    <1  (0-5)  /hpf


 


Ur Squamous Epith Cells    <1  (0-4)  /hpf


 


Urine Mucus    Rare H  (None)  /hpf














  04/13/22 Range/Units





  03:29 


 


WBC   (3.8-10.6)  k/uL


 


RBC   (3.80-5.40)  m/uL


 


Hgb   (11.4-16.0)  gm/dL


 


Hct   (34.0-46.0)  %


 


MCV   (80.0-100.0)  fL


 


MCH   (25.0-35.0)  pg


 


MCHC   (31.0-37.0)  g/dL


 


RDW   (11.5-15.5)  %


 


Plt Count   (150-450)  k/uL


 


MPV   


 


Neutrophils %   %


 


Lymphocytes %   %


 


Monocytes %   %


 


Eosinophils %   %


 


Basophils %   %


 


Neutrophils #   (1.3-7.7)  k/uL


 


Lymphocytes #   (1.0-4.8)  k/uL


 


Monocytes #   (0-1.0)  k/uL


 


Eosinophils #   (0-0.7)  k/uL


 


Basophils #   (0-0.2)  k/uL


 


PT   (9.0-12.0)  sec


 


INR   (<1.2)  


 


APTT   (22.0-30.0)  sec


 


Sodium   (137-145)  mmol/L


 


Potassium   (3.5-5.1)  mmol/L


 


Chloride   ()  mmol/L


 


Carbon Dioxide   (22-30)  mmol/L


 


Anion Gap   mmol/L


 


BUN   (7-17)  mg/dL


 


Creatinine   (0.52-1.04)  mg/dL


 


Est GFR (CKD-EPI)AfAm   (>60 ml/min/1.73 sqM)  


 


Est GFR (CKD-EPI)NonAf   (>60 ml/min/1.73 sqM)  


 


Glucose   (74-99)  mg/dL


 


Lactic Ac Sepsis Rflx  Y  


 


Plasma Lactic Acid Levi   (0.7-2.0)  mmol/L


 


Calcium   (8.4-10.2)  mg/dL


 


Total Bilirubin   (0.2-1.3)  mg/dL


 


AST   (14-36)  U/L


 


ALT   (4-34)  U/L


 


Alkaline Phosphatase   ()  U/L


 


Troponin I   (0.000-0.034)  ng/mL


 


Total Protein   (6.3-8.2)  g/dL


 


Albumin   (3.5-5.0)  g/dL


 


Amylase   ()  U/L


 


Lipase   ()  U/L


 


Urine Color   


 


Urine Appearance   (Clear)  


 


Urine pH   (5.0-8.0)  


 


Ur Specific Gravity   (1.001-1.035)  


 


Urine Protein   (Negative)  


 


Urine Glucose (UA)   (Negative)  


 


Urine Ketones   (Negative)  


 


Urine Blood   (Negative)  


 


Urine Nitrite   (Negative)  


 


Urine Bilirubin   (Negative)  


 


Urine Urobilinogen   (<2.0)  mg/dL


 


Ur Leukocyte Esterase   (Negative)  


 


Urine RBC   (0-5)  /hpf


 


Urine WBC   (0-5)  /hpf


 


Ur Squamous Epith Cells   (0-4)  /hpf


 


Urine Mucus   (None)  /hpf














- EKG Data


-: EKG Interpreted by Me (As well as ED attending physician)


EKG Comments: 





EKG reveals atrial fibrillation with ventricular rate of 89.


No evidence of significant ST elevation or ST depression.  Normal axis.  Normal 

QRS morphology.  MS interval is indiscernible.  Normal intervals otherwise.





Disposition


Clinical Impression: 


 Atrial fibrillation, new onset, Abdominal pain, Ileus, Hypertensive urgency





Disposition: ADMITTED [facility] IP TO [facility] [place]


Time of Disposition: 03:35


Decision to Admit Reason: Admit from EC


Decision Time: 03:36

## 2022-04-13 NOTE — P.HPIM
History of Present Illness


H&P Date: 04/13/22


Chief Complaint: Abdominal pain





This is a 71-year-old patient, follows with Dr. Pandya at [facility].  

Chronic stable medical conditions include COPD, hypertension, hyperlipidemia, 

dementia.


Patient herself is not able to give much of a history except for abdominal pain.

 As per the EMS report they will call doubt patient had been complaining of 

abdominal pain.  No fever no chills no nausea vomiting was reported.  Patient 

really cannot tell much.  She is brought into the ER.  Patient denies any fever 

and chills.  Denies any urinary symptoms.  Cannot tell me about her bowel 

pattern.





Review of systems:


GEN.:  Tired


EYES: None


HEENT: None


NECK: None


RESPIRATORY: None


CARDIOVASCULAR: None


GASTROINTESTINAL: As above


GENITOURINARY: None


MUSCULOSKELETAL: Joint pains


LYMPHATICS: None


HEMATOLOGICAL: None  


PSYCHIATRY: Forgetful


NEUROLOGICAL: None





Past medical history to include:


COPD, hypertension, hyperlipidemia, dementia





Social history:


Resident at [facility].  Ex-smoker.





Physical examination:


VITAL SIGNS: 98.3, 61, 18, 173/98, 97% room air]


GENERAL: BMI 39.5, reclining in bed awake, not in distress.


EYES: Pupils equal.  Conjunctiva normal.


HEENT: External appearance of nose and ears normal, oral cavity grossly normal.


NECK: JVD not raised; masses not palpable.


HEART: First and second heart sounds are normal;  no edema.  


LUNGS: Respiratory rate normal; decreased breath sounds.  


ABDOMEN: Soft, upper abdominal tenderness, no guarding rigidity, liver spleen 

not palpable, no masses palpable.  


PSYCH: Patient knows her name, knows the year and the month.  Not able to give 

much details about her presentation of the hospital.l.  


MUSCULOSKELETAL:No Clubbing/cyanosis;muscles-grossly intact.  Evidence of OA.


NEUROLOGICAL: Cranial nerves grossly intact; no facial asymmetry,  sensation 

grossly intact.  Unable to lift her legs off the bed.  


LYMPHATICS: No lymph nodes palpable in the axilla and neck





INVESTIGATIONS, reviewed in the clinical context:


White count 12.3 hemoglobin 13.3 platelets 226 sodium 136 potassium 4.4 

creatinine 0.8 blood glucose 179


Lactic acid 3.3 then 4.7 total bilirubin 1.2 AST 32 ALT 16 lipase 65


UA:: Leukoesterase negative


EKG tracing personally reviewed by me-atrial fibrillation rate 89


Acute abdominal series personally reviewed by me: Check stat x-ray no obvious 

infiltrate.  Dilated bowel appeared to be small


2-D echocardiogram: Mild concentric LVH.  EF 50-60%





Assessment and plan:





-Patient presented with acute abdomen pain starting yesterday.  Patient has 

lactic acidosis.  X-ray showing that it looks a small bowel.  General surgery 

consulted.





-Major cognitive impairment likely from Alzheimer's dementia





-Obesity BMI 39.5





-Primary osteoarthritis multiple joints bilaterally


Voltaren gel 2 g topical 4 times a day, Tylenol 3 when necessary





-Essential hypertension


Lopressor 25 mg twice a day





-Hyperlipidemia


Pravachol 20 mg daily at bedtime





-Persistent atrial fibrillation, duration unknown rate controlled


Lopressor 25 mg twice a day





Dr. Vela is taking the patient down for surgery later today.  Ideally to 

hold heparin for at least 6 hours.  Because of risk of bleeding.  Patient's is 

moderate to high risk for surgery given her poor exercise tolerance.  Patient 

has no active cardiac symptoms.  Given the urgency of surgery per Dr. Vela 

patient otherwise stable to proceed for surgery.  Telemetry monitoring.  IV 

fluids.  Cardiovascular monitoring.


Given the complexity and severity of patient's condition expect the patient to 

be in the hospital at least for 2 overnights.  Cardiology also consulted











Past Medical History


Past Medical History: COPD, Hyperlipidemia, Hypertension


History of Any Multi-Drug Resistant Organisms: None Reported


Past Surgical History: Hysterectomy


Past Psychological History: No Psychological Hx Reported


Smoking Status: Former smoker


Past Alcohol Use History: None Reported


Past Drug Use History: None Reported





Medications and Allergies


                                Home Medications











 Medication  Instructions  Recorded  Confirmed  Type


 


Acetaminophen Tab [Tylenol] 650 mg PO BID@0800,1700 04/13/22 04/13/22 History


 


Acetaminophen [Tylenol 8 Hour] 650 mg PO Q4H PRN 04/13/22 04/13/22 History


 


Acetaminophen-Codeine 300-30mg 1 tab PO Q12H PRN 04/13/22 04/13/22 History





[Tylenol w/codeine #3]    


 


Aspirin 325 mg PO DAILY@1700 04/13/22 04/13/22 History


 


Cyanocobalamin [Vitamin B-12] 500 mcg PO DAILY@1700 04/13/22 04/13/22 History


 


Diclofenac Sodium Gel [Voltaren 2 gm TOPICAL QID 04/13/22 04/13/22 History





Gel]    


 


Ergocalciferol [Vitamin D2 (1250 1,250 mcg PO SANTORO@1700 04/13/22 04/13/22 History





Mcg = 58123 Iu)]    


 


Folic Acid 1 mg PO DAILY@1700 04/13/22 04/13/22 History


 


Furosemide [Lasix] 60 mg PO DAILY@1700 04/13/22 04/13/22 History


 


Magnesium Hydroxide [Milk of 7,200 mg PO DAILY PRN 04/13/22 04/13/22 History





Magnesia Concentrate]    


 


Memantine [Namenda] 10 mg PO BID@0800,1700 04/13/22 04/13/22 History


 


Metoprolol Tartrate [Lopressor] 25 mg PO BID@0800,1700 04/13/22 04/13/22 History


 


Na Phos,M-B/Na Phos,Di-Ba [Fleet 133 ml RECTAL DAILY PRN 04/13/22 04/13/22 

History





Adult]    


 


Potassium Chloride [Klor-Con 10 ER] 10 meq PO DAILY@0800 04/13/22 04/13/22 

History


 


Pravastatin Sodium [Pravachol] 20 mg PO HS@2100 04/13/22 04/13/22 History


 


Ubidecarenone [Co Q-10] 100 mg PO DAILY@1700 04/13/22 04/13/22 History


 


bisacodyL [Dulcolax] 10 mg RECTAL DAILY PRN 04/13/22 04/13/22 History








                                    Allergies











Allergy/AdvReac Type Severity Reaction Status Date / Time


 


doxycycline [From Vibramycin] Allergy  Unknown Verified 04/13/22 06:25


 


niacin Allergy  Unknown Verified 04/13/22 06:25


 


simvastatin [From Zocor] Allergy  Unknown Verified 04/13/22 06:25














Physical Exam


Vitals: 


                                   Vital Signs











  Temp Pulse Resp BP Pulse Ox


 


 04/13/22 06:48   104 H   171/99  98


 


 04/13/22 06:08   77  18  203/102  98


 


 04/13/22 04:36   86  16  205/106  98


 


 04/13/22 03:24   88  18  194/103  98


 


 04/13/22 01:45  98.3 F  61  18  215/95  98








                                Intake and Output











 04/12/22 04/13/22 04/13/22





 22:59 06:59 14:59


 


Other:   


 


  Weight  104.326 kg 














Results


CBC & Chem 7: 


                                 04/13/22 02:13





                                 04/13/22 02:13


Labs: 


                  Abnormal Lab Results - Last 24 Hours (Table)











  04/13/22 04/13/22 04/13/22 Range/Units





  02:13 02:13 02:38 


 


WBC  12.3 H    (3.8-10.6)  k/uL


 


MCHC  30.9 L    (31.0-37.0)  g/dL


 


Neutrophils #  10.8 H    (1.3-7.7)  k/uL


 


Lymphocytes #  0.7 L    (1.0-4.8)  k/uL


 


Sodium   136 L   (137-145)  mmol/L


 


Chloride   96 L   ()  mmol/L


 


Glucose   179 H   (74-99)  mg/dL


 


Plasma Lactic Acid Levi    3.3 H*  (0.7-2.0)  mmol/L


 


Alkaline Phosphatase   127 H   ()  U/L


 


Total Protein   8.7 H   (6.3-8.2)  g/dL


 


Urine Protein     (Negative)  


 


Urine Glucose (UA)     (Negative)  


 


Urine Blood     (Negative)  


 


Urine RBC     (0-5)  /hpf


 


Urine Mucus     (None)  /hpf














  04/13/22 Range/Units





  02:50 


 


WBC   (3.8-10.6)  k/uL


 


MCHC   (31.0-37.0)  g/dL


 


Neutrophils #   (1.3-7.7)  k/uL


 


Lymphocytes #   (1.0-4.8)  k/uL


 


Sodium   (137-145)  mmol/L


 


Chloride   ()  mmol/L


 


Glucose   (74-99)  mg/dL


 


Plasma Lactic Acid Levi   (0.7-2.0)  mmol/L


 


Alkaline Phosphatase   ()  U/L


 


Total Protein   (6.3-8.2)  g/dL


 


Urine Protein  1+ H  (Negative)  


 


Urine Glucose (UA)  Trace H  (Negative)  


 


Urine Blood  Trace H  (Negative)  


 


Urine RBC  9 H  (0-5)  /hpf


 


Urine Mucus  Rare H  (None)  /hpf

## 2022-04-13 NOTE — P.GSCN
History of Present Illness


Consult date: 04/13/22


Reason for Consult: 





Abdominal pain, nausea


History of present illness: 





This is a 71-year-old female who's admitted through the emergency room.  

Patient's had complaints of abdominal pain and nausea for several days.  

Patient's poor historian.  She is unable to give me any significant medical 

history.  Patient's initial x-rays morning several loops of dilated small bowel.

 Her CAT scan performed oral contrast is suggestive of a closed loop obstruction

with possible bowel ischemia.  Patient states she does have significant pain.





Past Medical History


Past Medical History: COPD, Hyperlipidemia, Hypertension


History of Any Multi-Drug Resistant Organisms: None Reported


Past Surgical History: Hysterectomy


Past Psychological History: No Psychological Hx Reported


Smoking Status: Former smoker


Past Alcohol Use History: None Reported


Past Drug Use History: None Reported





Medications and Allergies


                                Home Medications











 Medication  Instructions  Recorded  Confirmed  Type


 


Acetaminophen Tab [Tylenol] 650 mg PO BID@0800,1700 04/13/22 04/13/22 History


 


Acetaminophen [Tylenol 8 Hour] 650 mg PO Q4H PRN 04/13/22 04/13/22 History


 


Acetaminophen-Codeine 300-30mg 1 tab PO Q12H PRN 04/13/22 04/13/22 History





[Tylenol w/codeine #3]    


 


Aspirin 325 mg PO DAILY@1700 04/13/22 04/13/22 History


 


Cyanocobalamin [Vitamin B-12] 500 mcg PO DAILY@1700 04/13/22 04/13/22 History


 


Diclofenac Sodium Gel [Voltaren 2 gm TOPICAL QID 04/13/22 04/13/22 History





Gel]    


 


Ergocalciferol [Vitamin D2 (1250 1,250 mcg PO SANTORO@1700 04/13/22 04/13/22 History





Mcg = 46556 Iu)]    


 


Folic Acid 1 mg PO DAILY@1700 04/13/22 04/13/22 History


 


Furosemide [Lasix] 60 mg PO DAILY@1700 04/13/22 04/13/22 History


 


Magnesium Hydroxide [Milk of 7,200 mg PO DAILY PRN 04/13/22 04/13/22 History





Magnesia Concentrate]    


 


Memantine [Namenda] 10 mg PO BID@0800,1700 04/13/22 04/13/22 History


 


Metoprolol Tartrate [Lopressor] 25 mg PO BID@0800,1700 04/13/22 04/13/22 History


 


Na Phos,M-B/Na Phos,Di-Ba [Fleet 133 ml RECTAL DAILY PRN 04/13/22 04/13/22 

History





Adult]    


 


Potassium Chloride [Klor-Con 10 ER] 10 meq PO DAILY@0800 04/13/22 04/13/22 

History


 


Pravastatin Sodium [Pravachol] 20 mg PO HS@2100 04/13/22 04/13/22 History


 


Ubidecarenone [Co Q-10] 100 mg PO DAILY@1700 04/13/22 04/13/22 History


 


bisacodyL [Dulcolax] 10 mg RECTAL DAILY PRN 04/13/22 04/13/22 History








                                    Allergies











Allergy/AdvReac Type Severity Reaction Status Date / Time


 


doxycycline [From Vibramycin] Allergy  Unknown Verified 04/13/22 06:25


 


niacin Allergy  Unknown Verified 04/13/22 06:25


 


simvastatin [From Zocor] Allergy  Unknown Verified 04/13/22 06:25














Surgical - Exam


                                   Vital Signs











Temp Pulse Resp BP Pulse Ox


 


 98.3 F   61   18   215/95   98 


 


 04/13/22 01:45  04/13/22 01:45  04/13/22 01:45  04/13/22 01:45  04/13/22 01:45














- General


moderate pain, obese





- Eyes


PERRL





- ENT


normal pinna





- Neck


no masses





- Respiratory


normal expansion





- Cardiovascular


Rhythm: regular





- Abdomen





Abdomen is soft.  Abdomen is distended.  On palpation there is significant 

tenderness.





Results





- Labs





                                 04/13/22 02:13





                                 04/13/22 02:13


                  Abnormal Lab Results - Last 24 Hours (Table)











  04/13/22 04/13/22 04/13/22 Range/Units





  02:13 02:13 02:38 


 


WBC  12.3 H    (3.8-10.6)  k/uL


 


MCHC  30.9 L    (31.0-37.0)  g/dL


 


Neutrophils #  10.8 H    (1.3-7.7)  k/uL


 


Lymphocytes #  0.7 L    (1.0-4.8)  k/uL


 


APTT     (22.0-30.0)  sec


 


Sodium   136 L   (137-145)  mmol/L


 


Chloride   96 L   ()  mmol/L


 


Glucose   179 H   (74-99)  mg/dL


 


Plasma Lactic Acid Levi    3.3 H*  (0.7-2.0)  mmol/L


 


Alkaline Phosphatase   127 H   ()  U/L


 


Total Protein   8.7 H   (6.3-8.2)  g/dL


 


Urine Protein     (Negative)  


 


Urine Glucose (UA)     (Negative)  


 


Urine Blood     (Negative)  


 


Urine RBC     (0-5)  /hpf


 


Urine Mucus     (None)  /hpf














  04/13/22 04/13/22 04/13/22 Range/Units





  02:50 10:07 10:07 


 


WBC     (3.8-10.6)  k/uL


 


MCHC     (31.0-37.0)  g/dL


 


Neutrophils #     (1.3-7.7)  k/uL


 


Lymphocytes #     (1.0-4.8)  k/uL


 


APTT    43.9 H  (22.0-30.0)  sec


 


Sodium     (137-145)  mmol/L


 


Chloride     ()  mmol/L


 


Glucose     (74-99)  mg/dL


 


Plasma Lactic Acid Levi   4.7 H*   (0.7-2.0)  mmol/L


 


Alkaline Phosphatase     ()  U/L


 


Total Protein     (6.3-8.2)  g/dL


 


Urine Protein  1+ H    (Negative)  


 


Urine Glucose (UA)  Trace H    (Negative)  


 


Urine Blood  Trace H    (Negative)  


 


Urine RBC  9 H    (0-5)  /hpf


 


Urine Mucus  Rare H    (None)  /hpf








                                 Diabetes panel











  04/13/22 Range/Units





  02:13 


 


Sodium  136 L  (137-145)  mmol/L


 


Potassium  4.4  (3.5-5.1)  mmol/L


 


Chloride  96 L  ()  mmol/L


 


Carbon Dioxide  30  (22-30)  mmol/L


 


BUN  14  (7-17)  mg/dL


 


Creatinine  0.80  (0.52-1.04)  mg/dL


 


Glucose  179 H  (74-99)  mg/dL


 


Calcium  9.5  (8.4-10.2)  mg/dL


 


AST  32  (14-36)  U/L


 


ALT  16  (4-34)  U/L


 


Alkaline Phosphatase  127 H  ()  U/L


 


Total Protein  8.7 H  (6.3-8.2)  g/dL


 


Albumin  4.7  (3.5-5.0)  g/dL








                                  Calcium panel











  04/13/22 Range/Units





  02:13 


 


Calcium  9.5  (8.4-10.2)  mg/dL


 


Albumin  4.7  (3.5-5.0)  g/dL








                                 Pituitary panel











  04/13/22 Range/Units





  02:13 


 


Sodium  136 L  (137-145)  mmol/L


 


Potassium  4.4  (3.5-5.1)  mmol/L


 


Chloride  96 L  ()  mmol/L


 


Carbon Dioxide  30  (22-30)  mmol/L


 


BUN  14  (7-17)  mg/dL


 


Creatinine  0.80  (0.52-1.04)  mg/dL


 


Glucose  179 H  (74-99)  mg/dL


 


Calcium  9.5  (8.4-10.2)  mg/dL








                                  Adrenal panel











  04/13/22 Range/Units





  02:13 


 


Sodium  136 L  (137-145)  mmol/L


 


Potassium  4.4  (3.5-5.1)  mmol/L


 


Chloride  96 L  ()  mmol/L


 


Carbon Dioxide  30  (22-30)  mmol/L


 


BUN  14  (7-17)  mg/dL


 


Creatinine  0.80  (0.52-1.04)  mg/dL


 


Glucose  179 H  (74-99)  mg/dL


 


Calcium  9.5  (8.4-10.2)  mg/dL


 


Total Bilirubin  1.2  (0.2-1.3)  mg/dL


 


AST  32  (14-36)  U/L


 


ALT  16  (4-34)  U/L


 


Alkaline Phosphatase  127 H  ()  U/L


 


Total Protein  8.7 H  (6.3-8.2)  g/dL


 


Albumin  4.7  (3.5-5.0)  g/dL














Assessment and Plan


Assessment: 


Computed tomography scan shows possible small bowel obstruction with possible 

closed loop obstruction.  Patient is tender.  Patient will undergo exploratory 

laparotomy today.

## 2022-04-14 LAB
ANION GAP SERPL CALC-SCNC: 6 MMOL/L
BASOPHILS # BLD AUTO: 0 K/UL (ref 0–0.2)
BASOPHILS NFR BLD AUTO: 0 %
BUN SERPL-SCNC: 20 MG/DL (ref 7–17)
CALCIUM SPEC-MCNC: 7.8 MG/DL (ref 8.4–10.2)
CHLORIDE SERPL-SCNC: 100 MMOL/L (ref 98–107)
CO2 SERPL-SCNC: 28 MMOL/L (ref 22–30)
EOSINOPHIL # BLD AUTO: 0 K/UL (ref 0–0.7)
EOSINOPHIL NFR BLD AUTO: 0 %
ERYTHROCYTE [DISTWIDTH] IN BLOOD BY AUTOMATED COUNT: 2.8 M/UL (ref 3.8–5.4)
ERYTHROCYTE [DISTWIDTH] IN BLOOD: 14.4 % (ref 11.5–15.5)
GLUCOSE SERPL-MCNC: 114 MG/DL (ref 74–99)
HCT VFR BLD AUTO: 26.8 % (ref 34–46)
HGB BLD-MCNC: 8.6 GM/DL (ref 11.4–16)
INR PPP: 1.1 (ref ?–1.2)
LYMPHOCYTES # SPEC AUTO: 1.1 K/UL (ref 1–4.8)
LYMPHOCYTES NFR SPEC AUTO: 7 %
MCH RBC QN AUTO: 30.7 PG (ref 25–35)
MCHC RBC AUTO-ENTMCNC: 32 G/DL (ref 31–37)
MCV RBC AUTO: 95.8 FL (ref 80–100)
MONOCYTES # BLD AUTO: 0.8 K/UL (ref 0–1)
MONOCYTES NFR BLD AUTO: 5 %
NEUTROPHILS # BLD AUTO: 14.2 K/UL (ref 1.3–7.7)
NEUTROPHILS NFR BLD AUTO: 87 %
PLATELET # BLD AUTO: 208 K/UL (ref 150–450)
POTASSIUM SERPL-SCNC: 4.1 MMOL/L (ref 3.5–5.1)
PT BLD: 12.1 SEC (ref 9–12)
SODIUM SERPL-SCNC: 134 MMOL/L (ref 137–145)
WBC # BLD AUTO: 16.3 K/UL (ref 3.8–10.6)

## 2022-04-14 RX ADMIN — HEPARIN SODIUM SCH: 10000 INJECTION, SOLUTION INTRAVENOUS at 10:50

## 2022-04-14 RX ADMIN — MEMANTINE HYDROCHLORIDE SCH MG: 10 TABLET ORAL at 10:18

## 2022-04-14 RX ADMIN — CEFAZOLIN SCH MLS/HR: 330 INJECTION, POWDER, FOR SOLUTION INTRAMUSCULAR; INTRAVENOUS at 10:14

## 2022-04-14 RX ADMIN — PRAVASTATIN SODIUM SCH MG: 20 TABLET ORAL at 21:42

## 2022-04-14 RX ADMIN — FAMOTIDINE SCH MG: 20 TABLET, FILM COATED ORAL at 09:52

## 2022-04-14 RX ADMIN — METOPROLOL TARTRATE SCH MG: 25 TABLET, FILM COATED ORAL at 17:42

## 2022-04-14 RX ADMIN — CEFAZOLIN SCH: 330 INJECTION, POWDER, FOR SOLUTION INTRAMUSCULAR; INTRAVENOUS at 22:17

## 2022-04-14 RX ADMIN — DICLOFENAC SODIUM SCH: 10 GEL TOPICAL at 21:42

## 2022-04-14 RX ADMIN — DICLOFENAC SODIUM SCH GM: 10 GEL TOPICAL at 10:17

## 2022-04-14 RX ADMIN — PIPERACILLIN AND TAZOBACTAM SCH MLS/HR: 3; .375 INJECTION, POWDER, FOR SOLUTION INTRAVENOUS at 17:26

## 2022-04-14 RX ADMIN — DICLOFENAC SODIUM SCH: 10 GEL TOPICAL at 13:58

## 2022-04-14 RX ADMIN — CYANOCOBALAMIN TAB 500 MCG SCH MCG: 500 TAB at 16:57

## 2022-04-14 RX ADMIN — ACETAMINOPHEN SCH MG: 325 TABLET, FILM COATED ORAL at 09:51

## 2022-04-14 RX ADMIN — ACETAMINOPHEN SCH MG: 325 TABLET, FILM COATED ORAL at 16:56

## 2022-04-14 RX ADMIN — ACETAMINOPHEN PRN MG: 325 TABLET, FILM COATED ORAL at 22:30

## 2022-04-14 RX ADMIN — LOSARTAN POTASSIUM SCH MG: 50 TABLET, FILM COATED ORAL at 09:52

## 2022-04-14 RX ADMIN — MEMANTINE HYDROCHLORIDE SCH MG: 10 TABLET ORAL at 16:56

## 2022-04-14 RX ADMIN — CEFAZOLIN SCH MLS/HR: 330 INJECTION, POWDER, FOR SOLUTION INTRAMUSCULAR; INTRAVENOUS at 10:51

## 2022-04-14 RX ADMIN — SODIUM FERRIC GLUCONATE COMPLEX SCH MLS/HR: 12.5 INJECTION INTRAVENOUS at 16:23

## 2022-04-14 RX ADMIN — DICLOFENAC SODIUM SCH: 10 GEL TOPICAL at 17:39

## 2022-04-14 RX ADMIN — METOPROLOL TARTRATE SCH MG: 25 TABLET, FILM COATED ORAL at 04:14

## 2022-04-14 RX ADMIN — FOLIC ACID SCH MG: 1 TABLET ORAL at 16:56

## 2022-04-14 NOTE — PN
PROGRESS NOTE



This lady underwent yesterday surgery by laparotomy and small-bowel resection.  She

remains in atrial fibrillation. Rate is somewhat better controlled.  Her blood pressure

control is also optimized.  Urine output is fairly decent.  She is currently on a

Cardizem drip, which I will continue until we can give her oral medications, or if the

rate is reasonably well controlled we can decrease the drip.  S1, S2 heard normally.

Irregular rhythm noted. Short systolic murmur noted. Lungs reveal diminished air entry.

Abdomen is soft.  Lower extremities reveal diminished pulses.  Central nervous system

is normal.



RECOMMENDATIONS:

For now we will continue IV Cardizem. If rate control is good, we can discontinue this

and see how she does.  As soon as possible, once oral medications are resumed, we can

give her beta blockers.  Her anticoagulation should be resumed, but I will let the

surgeon make the decision.





YAHIR / RANJEET: [id number] / Job#: [id number]

## 2022-04-14 NOTE — P.PN
Progress Note - Text


Progress Note Date: 04/14/22





Chief Complaint: Abdominal pain





This is a 71-year-old patient, follows with Dr. Pandya at [facility].  

Chronic stable medical conditions include COPD, hypertension, hyperlipidemia, 

dementia.


Patient herself is not able to give much of a history except for abdominal pain.

 As per the EMS report they will call doubt patient had been complaining of 

abdominal pain.  No fever no chills no nausea vomiting was reported.  Patient 

really cannot tell much.  She is brought into the ER.  Patient denies any fever 

and chills.  Denies any urinary symptoms.  Cannot tell me about her bowel 

pattern.


April 14: ICU: Patient was taken to the OR yesterday by Dr. Vela and had 

surgery for strangulate and internal hernia with small bowel obstruction.  NG 

tube to suction in place.  On room air.  Has an abdominal dressing.  Esteves 

catheter.  Elevated shows sinus rhythm with PACs.  Pain control.  Given Catapres

patch for blood pressure.  Patient was noted to have bloody ascites.  Suspect 

underlying perforation.  Started IV Zosyn.





Active Medications





Acetaminophen (Acetaminophen Tab 325 Mg Tab)  650 mg PO Q4H PRN


   PRN Reason: GENERAL DISCOMFORT


Acetaminophen (Acetaminophen Tab 325 Mg Tab)  650 mg PO BID@0800,1700 [facility]


   Last Admin: 04/14/22 09:51 Dose:  650 mg


   Documented by: 


Acetaminophen/Codeine Phosphate (Acetaminophen-Codeine 300-30mg Tab)  1 each PO 

Q12H PRN


   PRN Reason: Pain


Amlodipine Besylate (Amlodipine 5 Mg Tab)  5 mg PO BID [facility]


   Last Admin: 04/14/22 09:52 Dose:  5 mg


   Documented by: 


Cyanocobalamin (Cyanocobalamin 500 Mcg Tab)  500 mcg PO DAILY@1700 [facility]


   Last Admin: 04/13/22 23:00 Dose:  Not Given


   Documented by: 


Diclofenac Sodium (Diclofenac Sodium Gel 100 Gm Tube)  2 gm TOPICAL QID [facility]; 

Protocol


   Last Admin: 04/14/22 13:58 Dose:  Not Given


   Documented by: 


Ergocalciferol (Ergocalciferol 1,250 Mcg (50,000 Iu) Capsule)  1,250 mcg PO 

SANTORO@1700 [facility]


Famotidine (Famotidine 20 Mg Tab)  20 mg PO DAILY [facility]


   Last Admin: 04/14/22 09:52 Dose:  20 mg


   Documented by: 


Folic Acid (Folic Acid 1 Mg Tab)  1 mg PO DAILY@1700 [facility]


   Last Admin: 04/13/22 23:01 Dose:  Not Given


   Documented by: 


Heparin Sodium (Porcine) (Heparin Sodium 1,000 Un/Ml (10ml Vl))  0 unit IV PER 

PROTOCOL PRN; Protocol


   PRN Reason: Low PTT


Hydromorphone HCl (Hydromorphone 1 Mg/Ml 1 Ml Syringe)  1 mg IVP Q3HR PRN


   PRN Reason: Severe Pain


   Last Admin: 04/13/22 23:21 Dose:  1 mg


   Documented by: 


Heparin Sodium/Sodium Chloride (25,000 unit/ Sodium Chloride)  250 mls @ 10.036 

mls/hr IV .Q24H [facility]; Protocol


   Last Admin: 04/14/22 10:50 Dose:  Not Given


   Documented by: 


Sodium Chloride (Saline 0.9%)  1,000 mls @ 100 mls/hr IV .Q10H [facility]


   Last Admin: 04/14/22 10:51 Dose:  100 mls/hr


   Documented by: 


Lorazepam (Lorazepam 0.5 Mg Tab)  0.5 mg PO Q6HR PRN


   PRN Reason: Anxiety


Losartan Potassium (Losartan 50 Mg Tab)  50 mg PO DAILY [facility]


   Last Admin: 04/14/22 09:52 Dose:  50 mg


   Documented by: 


Melatonin (Melatonin 3 Mg Tablet)  3 mg PO HS PRN


   PRN Reason: Insomnia


Memantine (Memantine 10 Mg Tab)  10 mg PO BID@0800,1700 [facility]


   Last Admin: 04/14/22 10:18 Dose:  10 mg


   Documented by: 


Metoprolol Tartrate (Metoprolol Tartrate 25 Mg Tab)  25 mg PO Q12H [facility]


   Last Admin: 04/14/22 04:14 Dose:  25 mg


   Documented by: 


Naloxone HCl (Naloxone 0.4 Mg/Ml 1 Ml Vial)  0.2 mg IV Q2M PRN


   PRN Reason: Opioid Reversal


Ondansetron HCl (Ondansetron 4 Mg/2 Ml Vial)  4 mg IVP Q8HR PRN


   PRN Reason: Nausea And Vomiting


Pravastatin Sodium (Pravastatin Sodium 20 Mg Tab)  20 mg PO HS@2100 [facility]


   Last Admin: 04/13/22 23:13 Dose:  Not Given


   Documented by: 











Past medical history to include:


COPD, hypertension, hyperlipidemia, dementia





Social history:


Resident at [facility].  Ex-smoker.





Physical examination:


VITAL SIGNS: 99, 107, 19, 134/78, 95% room air


GENERAL: , reclining in bed awake, not in distress.


EYES: Pupils equal.  Conjunctiva normal.


HEENT: External appearance of nose and ears normal, oral cavity dry.  NG tube to

suction


NECK: JVD not raised; masses not palpable.


HEART: First and second heart sounds are normal;  no edema.  


LUNGS: Respiratory rate normal; decreased breath sounds.  


ABDOMEN: Soft, some tenderness, no guarding rigidity, liver spleen not palpable,

no masses palpable.  Dressing over incision.


PSYCH: Patient is able to answer simple questions.  


MUSCULOSKELETAL:No Clubbing/cyanosis;muscles-grossly intact.  Evidence of OA.








INVESTIGATIONS, reviewed in the clinical context:


April 14: White count 16.3 hemoglobin 8.6 platelets 208 potassium 4.1 creatinine

1.03 lactic acid 1.4


White count 12.3 hemoglobin 13.3 platelets 226 sodium 136 potassium 4.4 

creatinine 0.8 blood glucose 179


Lactic acid 3.3 then 4.7 total bilirubin 1.2 AST 32 ALT 16 lipase 65


UA:: Leukoesterase negative


EKG tracing personally reviewed by me-atrial fibrillation rate 89


Acute abdominal series personally reviewed by me: Check stat x-ray no obvious 

infiltrate.  Dilated bowel appeared to be small


2-D echocardiogram: Mild concentric LVH.  EF 50-60%





Assessment and plan:





-Acute strangulated internal hernia with small bowel obstruction.


Status post surgery by Dr. Vela on April 13.  NG tube to suction.





-Bloody ascites notice during surgery.  Suspect perforation.  We will cover for 

secondary peritonitis.


Start IV Zosyn





-Acute postprocedure blood loss anemia expected from surgery


IV Ferrlecit 2 doses





-Major cognitive impairment likely from Alzheimer's dementia





-Obesity BMI 39.5





-IV heparin monitoring


Follow PTT





-Primary osteoarthritis multiple joints bilaterally


Voltaren gel 2 g topical 4 times a day, Tylenol 3 when necessary





-Essential hypertension


Lopressor 25 mg twice a day.  Norvasc 5 mg twice a day





-Hyperlipidemia


Pravachol 20 mg daily at bedtime





-Persistent atrial fibrillation,  rate controlled


Lopressor 25 mg twice a day





NG tube to suction.  IV fluids.  IV heparin.  Nothing by mouth.  Had been on IV 

heparin.  Add IV Zosyn.

## 2022-04-14 NOTE — P.CNPUL
History of Present Illness


Consult date: 04/14/22


Requesting physician: Melquiades Vela


Reason for consult: other (Critical care management)


Chief complaint: Abdominal pain


History of present illness: 





This is a 71-year-old female patient with a known history of hypertension, 

hyperlipidemia, dementia, chronic obstructive pulmonary disease, former smoker. 

She was brought into the emergency room early yesterday morning with severe 

abdominal pain.  It was sharp constant no alleviating or exacerbating factors. 

Acute abdominal series revealed minimal atelectasis at the left lung base.  

Nonacute abdomen. CT angiogram revealed no evidence of pulmonary embolism.  No 

evidence of arterial aneurysm or dissection.  No evidence of hemodynamic stenos

is.   There was moderately dilated fluid-filled loop of the small bowel in the 

pelvis.  Possible mechanical bowel obstruction or severe localized ileus. 

Echocardiogram revealed preserved left ventricular systolic function without any

significant valvular abnormalities. computed tomography scan of the abdomen and 

pelvis revealed persistent focal dilations of the small bowel loops in the lower

abdomen highly suggestive of a closed loop obstruction.  Could be related to 

adhesions, internal hernia or abdominal bowel rotation.  Suspect early bowel 

ischemia. No jane pneumatosis or free peritoneal air. She was taken to the 

operating room last evening and had undergone an exploratory laparotomy, small 

bowel resection, repair of incisional hernia.  She was admitted to the intensive

care unit for closer observation.  She is seen today in consultation.  She is a 

wake and alert.  Confused.  Oriented times one.  She is maintaining good O2 

saturations in the 90s on 2 L/m per nasal cannula.  She has normal saline 

running at 100 ML's per hour. In sinus rhythm with frequent PACs. She did have a

brief episode of atrial fibrillation. White count 16.3.  Hemoglobin 8.6.  

Platelets 208.  Sodium 134.  Potassium 4.1.  BUN 20.  Creatinine 1.03.  Glucose 

114. Lactic acid 1.4. 





Review of Systems





REVIEW OF SYSTEMS:


CONSTITUTIONAL: Denies any recent significant weight loss or weight gain.


EYES: Denies change in vision.


EARS, NOSE, MOUTH, THROAT: Denies headaches, denies sore throat.


CARDIOVASCULAR: Denies chest pain, palpitations or syncopal episodes.


RESPIRATORY: Denies shortness of breath, cough, congestion or hemoptysis.


GASTROINTESTINAL: Positive for abdominal pain


GENITOURINARY: Denies hematuria, denies infections.


MUSKULOSKELETAL: Denies pain, denies swelling.


INTEGUMENTARY: Denies rash, denies eczema.


NEUROLOGICAL: Denies recent memory loss, no recent seizure activity. 


PSYCHIATRIC: Denies anxiety, denies depression.


HEMATOLOGIC/LYMPHATIC: Denies anemia, denies enlarged lymph nodes.








Past Medical History


Past Medical History: COPD, Hyperlipidemia, Hypertension


Additional Past Medical History / Comment(s): Diverticular disease, 

constipation, chronic anemia, PVD/venous insufficiency, lower extremity 

edema/discoloration, arthritis in multiple joints, gout.


History of Any Multi-Drug Resistant Organisms: None Reported


Past Surgical History: Hysterectomy


Additional Past Surgical History / Comment(s): Bilateral breast reductions, 

colonoscopy.


Past Anesthesia/Blood Transfusion Reactions: No Reported Reaction


Past Psychological History: No Psychological Hx Reported


Smoking Status: Former smoker


Past Alcohol Use History: None Reported


Past Drug Use History: None Reported





- Past Family History


  ** Mother


History Unknown: Yes


Additional Family Medical History / Comment(s): Pt was adopted.





  ** Father


History Unknown: Yes


Additional Family Medical History / Comment(s): Pt was adopted.





Medications and Allergies


                                Home Medications











 Medication  Instructions  Recorded  Confirmed  Type


 


Acetaminophen Tab [Tylenol] 650 mg PO BID@0800,1700 04/13/22 04/13/22 History


 


Acetaminophen [Tylenol 8 Hour] 650 mg PO Q4H PRN 04/13/22 04/13/22 History


 


Acetaminophen-Codeine 300-30mg 1 tab PO Q12H PRN 04/13/22 04/13/22 History





[Tylenol w/codeine #3]    


 


Aspirin 325 mg PO DAILY@1700 04/13/22 04/13/22 History


 


Cyanocobalamin [Vitamin B-12] 500 mcg PO DAILY@1700 04/13/22 04/13/22 History


 


Diclofenac Sodium Gel [Voltaren 2 gm TOPICAL QID 04/13/22 04/13/22 History





Gel]    


 


Ergocalciferol [Vitamin D2 (1250 1,250 mcg PO SANTORO@1700 04/13/22 04/13/22 History





Mcg = 95543 Iu)]    


 


Folic Acid 1 mg PO DAILY@1700 04/13/22 04/13/22 History


 


Furosemide [Lasix] 60 mg PO DAILY@1700 04/13/22 04/13/22 History


 


Magnesium Hydroxide [Milk of 7,200 mg PO DAILY PRN 04/13/22 04/13/22 History





Magnesia Concentrate]    


 


Memantine [Namenda] 10 mg PO BID@0800,1700 04/13/22 04/13/22 History


 


Metoprolol Tartrate [Lopressor] 25 mg PO BID@0800,1700 04/13/22 04/13/22 History


 


Na Phos,M-B/Na Phos,Di-Ba [Fleet 133 ml RECTAL DAILY PRN 04/13/22 04/13/22 

History





Adult]    


 


Potassium Chloride [Klor-Con 10 ER] 10 meq PO DAILY@0800 04/13/22 04/13/22 

History


 


Pravastatin Sodium [Pravachol] 20 mg PO HS@2100 04/13/22 04/13/22 History


 


Ubidecarenone [Co Q-10] 100 mg PO DAILY@1700 04/13/22 04/13/22 History


 


bisacodyL [Dulcolax] 10 mg RECTAL DAILY PRN 04/13/22 04/13/22 History








                                    Allergies











Allergy/AdvReac Type Severity Reaction Status Date / Time


 


doxycycline [From Vibramycin] Allergy  Unknown Verified 04/13/22 06:25


 


niacin Allergy  Unknown Verified 04/13/22 06:25


 


simvastatin [From Zocor] Allergy  Unknown Verified 04/13/22 06:25














Physical Exam


Vitals: 


                                   Vital Signs











  Temp Pulse Pulse Resp BP BP Pulse Ox


 


 04/14/22 07:48        97


 


 04/14/22 07:00   113 H   22  120/56   97


 


 04/14/22 06:00   115 H   20  137/54   96


 


 04/14/22 05:00   113 H   16  139/56   98


 


 04/14/22 04:00  100 F H  102 H  90  20  130/52   99


 


 04/14/22 03:00   105 H   19  123/58   99


 


 04/14/22 02:00   93   19  129/64   99


 


 04/14/22 01:00   93   16  145/74   99


 


 04/14/22 00:04   97   17  143/81   98


 


 04/14/22 00:00  98 F  96  90  14  127/68   97


 


 04/13/22 23:00   89   19  111/70   98


 


 04/13/22 22:00  98.6 F  89   16  130/72   96


 


 04/13/22 21:38     38 H    92 L


 


 04/13/22 21:21    90  16   136/58  99


 


 04/13/22 21:06    90  16   125/60  100


 


 04/13/22 20:51  97.6 F   106 H  16   120/56  97


 


 04/13/22 18:31   112 H   16  122/71   92 L


 


 04/13/22 11:32   92   20  178/106   95








                                Intake and Output











 04/13/22 04/14/22 04/14/22





 22:59 06:59 14:59


 


Intake Total 1150 800 100


 


Output Total 625 460 30


 


Balance 525 340 70


 


Intake:   


 


  IV 1150  


 


  Intake, IV Titration  800 100





  Amount   


 


    Sodium Chloride 0.9% 100  800 100





    ml @ 0 mls/hr IV .STK-MED   





    ONE with ceFAZolin 2,000   





    mg Rx#:[id number]   


 


Output:   


 


  Gastric Drainage  150 


 


  Urine 600 310 30


 


  Estimated Blood Loss 25  


 


Other:   


 


  Voiding Method  Indwelling Catheter 


 


  Weight 104.326 kg 120.8 kg 














GENERAL EXAM: Alert, pleasant 71-year-old female patient, oriented times one, on

2 L nasal cannula, comfortable in no apparent distress.


HEAD: Normocephalic.


EYES: Normal reaction of pupils, equal size.


NOSE: Clear with pink turbinates.


THROAT: No erythema or exudates.


NECK: No masses, no JVD.


CHEST: No chest wall deformity.


LUNGS: Equal air entry with no crackles, wheeze, rhonchi or dullness.


CVS: S1 and S2 normal with no audible murmur, regular rhythm.


ABDOMEN: Surgical dressing dry and intact.No hepatosplenomegaly, hypoactive 

bowel sounds, no guarding or rigidity.


SPINE: No scoliosis or deformity


SKIN: No rashes


CENTRAL NERVOUS SYSTEM: No focal deficits, tone is normal in all 4 extremities.


EXTREMITIES: There is no peripheral edema.  No clubbing, no cyanosis.  

Peripheral pulses are intact.





Results





- Laboratory Findings


CBC and BMP: 


                                 04/14/22 07:14





                                 04/14/22 07:14


PT/INR, D-dimer











PT  12.1 sec (9.0-12.0)  H  04/14/22  07:14    


 


INR  1.1  (<1.2)   04/14/22  07:14    








Abnormal lab findings: 


                                  Abnormal Labs











  04/13/22 04/13/22 04/13/22





  02:13 02:13 02:38


 


WBC  12.3 H  


 


RBC   


 


Hgb   


 


Hct   


 


MCHC  30.9 L  


 


Neutrophils #  10.8 H  


 


Lymphocytes #  0.7 L  


 


PT   


 


APTT   


 


Sodium   136 L 


 


Chloride   96 L 


 


BUN   


 


Glucose   179 H 


 


POC Glucose (mg/dL)   


 


Plasma Lactic Acid Levi    3.3 H*


 


Calcium   


 


Alkaline Phosphatase   127 H 


 


Total Protein   8.7 H 


 


Urine Protein   


 


Urine Glucose (UA)   


 


Urine Blood   


 


Urine RBC   


 


Urine Mucus   














  04/13/22 04/13/22 04/13/22





  02:50 10:07 10:07


 


WBC   


 


RBC   


 


Hgb   


 


Hct   


 


MCHC   


 


Neutrophils #   


 


Lymphocytes #   


 


PT   


 


APTT    43.9 H


 


Sodium   


 


Chloride   


 


BUN   


 


Glucose   


 


POC Glucose (mg/dL)   


 


Plasma Lactic Acid Levi   4.7 H* 


 


Calcium   


 


Alkaline Phosphatase   


 


Total Protein   


 


Urine Protein  1+ H  


 


Urine Glucose (UA)  Trace H  


 


Urine Blood  Trace H  


 


Urine RBC  9 H  


 


Urine Mucus  Rare H  














  04/13/22 04/13/22 04/13/22





  13:14 17:32 21:37


 


WBC   


 


RBC   


 


Hgb   


 


Hct   


 


MCHC   


 


Neutrophils #   


 


Lymphocytes #   


 


PT   


 


APTT   


 


Sodium   


 


Chloride   


 


BUN   


 


Glucose   


 


POC Glucose (mg/dL)    128 H


 


Plasma Lactic Acid Levi  3.7 H*  2.7 H* 


 


Calcium   


 


Alkaline Phosphatase   


 


Total Protein   


 


Urine Protein   


 


Urine Glucose (UA)   


 


Urine Blood   


 


Urine RBC   


 


Urine Mucus   














  04/14/22 04/14/22 04/14/22





  00:10 07:14 07:14


 


WBC   16.3 H 


 


RBC   2.80 L 


 


Hgb   8.6 L D 


 


Hct   26.8 L 


 


MCHC   


 


Neutrophils #   14.2 H 


 


Lymphocytes #   


 


PT    12.1 H


 


APTT   


 


Sodium   


 


Chloride   


 


BUN   


 


Glucose   


 


POC Glucose (mg/dL)   


 


Plasma Lactic Acid Levi  3.7 H*  


 


Calcium   


 


Alkaline Phosphatase   


 


Total Protein   


 


Urine Protein   


 


Urine Glucose (UA)   


 


Urine Blood   


 


Urine RBC   


 


Urine Mucus   














  04/14/22





  07:14


 


WBC 


 


RBC 


 


Hgb 


 


Hct 


 


MCHC 


 


Neutrophils # 


 


Lymphocytes # 


 


PT 


 


APTT 


 


Sodium  134 L


 


Chloride 


 


BUN  20 H


 


Glucose  114 H


 


POC Glucose (mg/dL) 


 


Plasma Lactic Acid Levi 


 


Calcium  7.8 L


 


Alkaline Phosphatase 


 


Total Protein 


 


Urine Protein 


 


Urine Glucose (UA) 


 


Urine Blood 


 


Urine RBC 


 


Urine Mucus 














Assessment and Plan


Assessment: 





1 Severe abdominal discomfort in a patient found to have persistent focal 

dilatation of the small bowel loops in the lower abdomen with findings highly 

suggestive of a closed loop obstruction.  Underlying etiology related to a

dhesions, hernia or abdominal bowel rotation not excluded.  Suspected early 

bowel ischemia. She did undergo exploratory laparotomy with small bowel 

resection and repair of incisional hernia.  Postoperative day #1.





2 History of hypertension





3 History of hyperlipidemia





4 Dementia





5 History of chronic obstructive pulmonary disease





6 Former smoker





7 Nursing home resident





8 Paroxysmal atrial fibrillation





Plan:





The patient was seen and evaluated


Currently stable from the critical care standpoint


Continue normal saline at 100 ML's per hour


Titrate the FiO2 as tolerated


Attempt to educate the patient regarding incentive spirometer


Could be transferred to a Med/Surg floor with telemetry


We will continue to follow and make further recommendations based on her 

clinical status





I have personally seen and examined the patient, performed the documentation and

the assessment and plan as written.  Number of minutes spent on the visit: 20

## 2022-04-14 NOTE — P.PN
Subjective


Progress Note Date: 04/14/22





CHIEF COMPLAINT: Small bowel obstruction





HISTORY OF PRESENT ILLNESS: Patient is postop day #1 status post exploratory 

laparotomy, small bowel resection and repair of incisional hernia for 

strangulated internal hernia with small bowel obstruction.  Patient seen in the 

ICU this morning.  She has a bedside sitter.  Patient had been pulling at her NG

tube.  Patient had about 150 ML of brownish output through the NG tube.  She did

have a low-grade temp of 100 and heart rate of 113.  She is on room air at 2 L 

satting 92%.  Her white count did go up from 12-16.3 hemoglobin dropped from 

13.3-8.6 platelets 208 patient denies any abdominal pain.  Denies any flatus or 

bowel activity.  Denies any nausea or vomiting.





Patient seen and examined with Dr. diaz





PHYSICAL EXAM: 


VITAL SIGNS: Reviewed.


GENERAL: Well-developed in no acute distress. 


HEENT:  No sclera icterus. Extraocular movements grossly intact.  Moist buccal 

mucosa. Head is atraumatic, normocephalic. 


ABDOMEN:  Soft.  Nondistended. Nontender.  Incision site clean dry and intact 

with fermin


NEUROLOGIC: Patient is awake and alert with some confusion





ASSESSMENT: 


1.  Strangulated internal hernia with small bowel obstruction status post 

laparotomy, small bowel resection and repair of incisional hernia


2.  Atrial fibrillation





PLAN: 


-Okay to transfer out of the ICU


-Okay to start anticoagulation tomorrow


-Incentive spirometer ordered


-Continue NG tube for decompression


-Continue IV fluids


-Continue supportive care


-Continue pain medication as needed





Physician Assistant note has been reviewed by physician. Signing provider agrees

with the documented findings, assessment, and plan of care. 





Objective





- Vital Signs


Vital signs: 


                                   Vital Signs











Temp  99.0 F   04/14/22 14:00


 


Pulse  107 H  04/14/22 14:00


 


Resp  19   04/14/22 14:00


 


BP  134/78   04/14/22 14:00


 


Pulse Ox  91 L  04/14/22 14:00








                                 Intake & Output











 04/13/22 04/14/22 04/14/22





 18:59 06:59 18:59


 


Intake Total  1950 950


 


Output Total  1085 400


 


Balance  865 550


 


Weight 104.326 kg 120.8 kg 


 


Intake:   


 


  IV  1150 600


 


    Sodium Chloride 0.9% 1,   600





    000 ml @ 100 mls/hr IV .   





    Q10H [facility] Rx#:[id number]   


 


  Intake, IV Titration  800 350





  Amount   


 


    Heparin Sod,Pork in 0.45%   250





    NaCl 25,000 unit In 0.45   





    % NaCl 1 250ml.bag @ 9.62   





    UNITS/KG/HR 10.036 mls/   





    hr IV .Q24H [facility] Rx#:   





    [id number]   


 


    Sodium Chloride 0.9% 100  800 100





    ml @ 0 mls/hr IV .STK-MED   





    ONE with ceFAZolin 2,000   





    mg Rx#:[id number]   


 


Output:   


 


  Gastric Drainage  150 100


 


  Urine  910 300


 


  Estimated Blood Loss  25 


 


Other:   


 


  Voiding Method  Indwelling Catheter Indwelling Catheter














- Labs


CBC & Chem 7: 


                                 04/14/22 07:14





                                 04/14/22 07:14


Labs: 


                  Abnormal Lab Results - Last 24 Hours (Table)











  04/13/22 04/13/22 04/14/22 Range/Units





  17:32 21:37 00:10 


 


WBC     (3.8-10.6)  k/uL


 


RBC     (3.80-5.40)  m/uL


 


Hgb     (11.4-16.0)  gm/dL


 


Hct     (34.0-46.0)  %


 


Neutrophils #     (1.3-7.7)  k/uL


 


PT     (9.0-12.0)  sec


 


Sodium     (137-145)  mmol/L


 


BUN     (7-17)  mg/dL


 


Glucose     (74-99)  mg/dL


 


POC Glucose (mg/dL)   128 H   (75-99)  mg/dL


 


Plasma Lactic Acid Levi  2.7 H*   3.7 H*  (0.7-2.0)  mmol/L


 


Calcium     (8.4-10.2)  mg/dL














  04/14/22 04/14/22 04/14/22 Range/Units





  07:14 07:14 07:14 


 


WBC  16.3 H    (3.8-10.6)  k/uL


 


RBC  2.80 L    (3.80-5.40)  m/uL


 


Hgb  8.6 L D    (11.4-16.0)  gm/dL


 


Hct  26.8 L    (34.0-46.0)  %


 


Neutrophils #  14.2 H    (1.3-7.7)  k/uL


 


PT   12.1 H   (9.0-12.0)  sec


 


Sodium    134 L  (137-145)  mmol/L


 


BUN    20 H  (7-17)  mg/dL


 


Glucose    114 H  (74-99)  mg/dL


 


POC Glucose (mg/dL)     (75-99)  mg/dL


 


Plasma Lactic Acid Levi     (0.7-2.0)  mmol/L


 


Calcium    7.8 L  (8.4-10.2)  mg/dL

## 2022-04-15 LAB
ERYTHROCYTE [DISTWIDTH] IN BLOOD BY AUTOMATED COUNT: 2.62 M/UL (ref 3.8–5.4)
ERYTHROCYTE [DISTWIDTH] IN BLOOD: 14.3 % (ref 11.5–15.5)
HCT VFR BLD AUTO: 26 % (ref 34–46)
HGB BLD-MCNC: 8.2 GM/DL (ref 11.4–16)
MCH RBC QN AUTO: 31.4 PG (ref 25–35)
MCHC RBC AUTO-ENTMCNC: 31.6 G/DL (ref 31–37)
MCV RBC AUTO: 99.4 FL (ref 80–100)
PLATELET # BLD AUTO: 164 K/UL (ref 150–450)
WBC # BLD AUTO: 15 K/UL (ref 3.8–10.6)

## 2022-04-15 PROCEDURE — 05HF33Z INSERTION OF INFUSION DEVICE INTO LEFT CEPHALIC VEIN, PERCUTANEOUS APPROACH: ICD-10-PCS

## 2022-04-15 RX ADMIN — METOPROLOL TARTRATE SCH MG: 25 TABLET, FILM COATED ORAL at 07:45

## 2022-04-15 RX ADMIN — FOLIC ACID SCH MG: 1 TABLET ORAL at 17:09

## 2022-04-15 RX ADMIN — DICLOFENAC SODIUM SCH: 10 GEL TOPICAL at 07:54

## 2022-04-15 RX ADMIN — ACETAMINOPHEN SCH MG: 325 TABLET, FILM COATED ORAL at 17:09

## 2022-04-15 RX ADMIN — PIPERACILLIN AND TAZOBACTAM SCH MLS/HR: 3; .375 INJECTION, POWDER, FOR SOLUTION INTRAVENOUS at 00:13

## 2022-04-15 RX ADMIN — DICLOFENAC SODIUM SCH: 10 GEL TOPICAL at 13:46

## 2022-04-15 RX ADMIN — HEPARIN SODIUM SCH: 10000 INJECTION, SOLUTION INTRAVENOUS at 07:22

## 2022-04-15 RX ADMIN — DICLOFENAC SODIUM SCH: 10 GEL TOPICAL at 17:10

## 2022-04-15 RX ADMIN — SODIUM FERRIC GLUCONATE COMPLEX SCH MLS/HR: 12.5 INJECTION INTRAVENOUS at 11:27

## 2022-04-15 RX ADMIN — CEFAZOLIN SCH: 330 INJECTION, POWDER, FOR SOLUTION INTRAMUSCULAR; INTRAVENOUS at 07:22

## 2022-04-15 RX ADMIN — FAMOTIDINE SCH MG: 20 TABLET, FILM COATED ORAL at 07:45

## 2022-04-15 RX ADMIN — CEFAZOLIN SCH: 330 INJECTION, POWDER, FOR SOLUTION INTRAMUSCULAR; INTRAVENOUS at 17:09

## 2022-04-15 RX ADMIN — MEMANTINE HYDROCHLORIDE SCH MG: 10 TABLET ORAL at 17:09

## 2022-04-15 RX ADMIN — METOPROLOL TARTRATE SCH MG: 50 TABLET, FILM COATED ORAL at 20:48

## 2022-04-15 RX ADMIN — LOSARTAN POTASSIUM SCH MG: 50 TABLET, FILM COATED ORAL at 07:44

## 2022-04-15 RX ADMIN — CYANOCOBALAMIN TAB 500 MCG SCH MCG: 500 TAB at 17:09

## 2022-04-15 RX ADMIN — ACETAMINOPHEN AND CODEINE PHOSPHATE PRN EACH: 300; 30 TABLET ORAL at 20:48

## 2022-04-15 RX ADMIN — MEMANTINE HYDROCHLORIDE SCH MG: 10 TABLET ORAL at 07:44

## 2022-04-15 RX ADMIN — ACETAMINOPHEN SCH MG: 325 TABLET, FILM COATED ORAL at 07:44

## 2022-04-15 RX ADMIN — PIPERACILLIN AND TAZOBACTAM SCH MLS/HR: 3; .375 INJECTION, POWDER, FOR SOLUTION INTRAVENOUS at 07:45

## 2022-04-15 RX ADMIN — PRAVASTATIN SODIUM SCH MG: 20 TABLET ORAL at 20:48

## 2022-04-15 RX ADMIN — PIPERACILLIN AND TAZOBACTAM SCH MLS/HR: 3; .375 INJECTION, POWDER, FOR SOLUTION INTRAVENOUS at 17:09

## 2022-04-15 NOTE — P.PN
Subjective


Progress Note Date: 04/15/22


This is a 71-year-old female patient with past medical history of dementia, 

COPD, hypertension, dyslipidemia and chronic lower extremity edema.  She is a 

former smoker.  She does not follow regularly with the cardiologist.  She was 

brought to the emergency department from [facility] for worsening upper abdominal 

pain.  We were asked to see the patient in consultation for atrial fibrillation,

new onset.  CT scan on admission showed bowel obstruction versus ileus.  She is 

physically underwent exploratory laparotomy and small bowel resection and repair

of incisional hernia.  She was initially on Cardizem drip and has been 

transitioned to oral beta blocker.  She is currently maintaining sinus 

mechanism.  Heart rate is currently in the 90s.  Heart pressure is stable.  Ex 

temperature over the last 24 hours 100.3F.  Echocardiogram with Doppler study 

this admission showed normal LV systolic function.  Laboratory values from 

yesterday showed a white blood cell count of 16,300 and hemoglobin of 8.6 which 

is down from 13.3.  Potassium 4.1, BUN 20 creatinine 1.03.  At the time of my 

examination she is resting comfortably in bed.  She denies any current 

complaints.  She is confused as she says that she ate breakfast this morning 

however she has been nothing by mouth.








Objective





- Vital Signs


Vital signs: 


                                   Vital Signs











Temp  98.0 F   04/15/22 07:11


 


Pulse  103 H  04/15/22 07:11


 


Resp  17   04/15/22 07:11


 


BP  121/71   04/15/22 07:11


 


Pulse Ox  92 L  04/15/22 07:11








                                 Intake & Output











 04/14/22 04/15/22 04/15/22





 18:59 06:59 18:59


 


Intake Total 950  


 


Output Total 740  


 


Balance 210  


 


Intake:   


 


    


 


    Sodium Chloride 0.9% 1, 600  





    000 ml @ 100 mls/hr IV .   





    Q10H [facility] Rx#:[id number]   


 


  Intake, IV Titration 350  





  Amount   


 


    Heparin Sod,Pork in 0.45% 250  





    NaCl 25,000 unit In 0.45   





    % NaCl 1 250ml.bag @ 9.62   





    UNITS/KG/HR 10.036 mls/   





    hr IV .Q24H [facility] Rx#:   





    [id number]   


 


    Sodium Chloride 0.9% 100 100  





    ml @ 0 mls/hr IV .STK-MED   





    ONE with ceFAZolin 2,000   





    mg Rx#:[id number]   


 


Output:   


 


  Gastric Drainage 100  


 


  Urine 640  


 


    Uretheral (Esteves) 340  


 


Other:   


 


  Voiding Method Indwelling Catheter Indwelling Catheter Indwelling Catheter














- Exam


PHYSICAL EXAMINATION: 





HEENT: Head is atraumatic, normocephalic.  Pupils equal, round.  Neck is supple.

 There is no elevated jugular venous pressure.





HEART EXAMINATION: Heart sounds regular, S1 and S2 normal.  No murmur or gallop 

heard.





CHEST EXAMINATION: Lungs are clear to auscultation and precussion. No chest wall

tenderness is noted on palpation or with deep breathing.





ABDOMEN:  Soft. Bowel sounds are hypoactive.  She's had no flatus or bowel 

movement.


 


EXTREMITIES: 2+ peripheral pulses with no evidence of peripheral edema and no 

calf tenderness noted.





NEUROLOGIC patient is awake, alert and oriented x1.


 


.


 











- Labs


CBC & Chem 7: 


                                 04/14/22 07:14





                                 04/14/22 07:14





Assessment and Plan


Assessment: 


#1 bowel obstruction, status post exploratory laparotomy with small bowel 

resection and incisional hernia repair


#2 paroxysmal atrial fibrillation, [id number] score 3


#3 hypertension


#4 dementia


#5 dyslipidemia


#6 chronic lower extremity edema





Plan: 


From a cardiology perspective we will increase the beta blocker.  Patient will 

require long-term anticoagulation.  We will start Eliquis 5mg BID once cleared 

by surgery.  We will continue to follow the patient right further 

recommendations accordingly.





NP note has been reviewed, I agree with a documented findings and plan of care. 

Patient was seen and examined.

## 2022-04-15 NOTE — P.PN
Subjective


Progress Note Date: 04/15/22


Principal diagnosis: 





Internal hernia





Patient doing well today.  Says her pain is minimal.  No nausea or vomiting.  

Denies flatus or bowel movement either though.  T-max 100.3 last night.  Labs 

pending.





Objective





- Vital Signs


Vital signs: 


                                   Vital Signs











Temp  98.0 F   04/15/22 07:11


 


Pulse  103 H  04/15/22 07:11


 


Resp  17   04/15/22 07:11


 


BP  121/71   04/15/22 07:11


 


Pulse Ox  92 L  04/15/22 07:11








                                 Intake & Output











 04/14/22 04/15/22 04/15/22





 18:59 06:59 18:59


 


Intake Total 950  


 


Output Total 740  


 


Balance 210  


 


Intake:   


 


    


 


    Sodium Chloride 0.9% 1, 600  





    000 ml @ 100 mls/hr IV .   





    Q10H [facility] Rx#:[id number]   


 


  Intake, IV Titration 350  





  Amount   


 


    Heparin Sod,Pork in 0.45% 250  





    NaCl 25,000 unit In 0.45   





    % NaCl 1 250ml.bag @ 9.62   





    UNITS/KG/HR 10.036 mls/   





    hr IV .Q24H [facility] Rx#:   





    [id number]   


 


    Sodium Chloride 0.9% 100 100  





    ml @ 0 mls/hr IV .STK-MED   





    ONE with ceFAZolin 2,000   





    mg Rx#:[id number]   


 


Output:   


 


  Gastric Drainage 100  


 


  Urine 640  


 


    Uretheral (Esteves) 340  


 


Other:   


 


  Voiding Method Indwelling Catheter Indwelling Catheter Indwelling Catheter














- Exam





Abdomen: Soft, nondistended, mild tenderness, dressing clean and dry





- Labs


CBC & Chem 7: 


                                 04/14/22 07:14





                                 04/14/22 07:14





Assessment and Plan


(1) Internal hernia


Narrative/Plan: 


Patient doing well postoperative.  Monitor fevers.  Recheck labs tomorrow.  Keep

nothing by mouth for now.  Gradually increase activity.


Current Visit: Yes   Status: Acute   Code(s): K45.8 - OTH ABDOMINAL HERNIA 

WITHOUT OBSTRUCTION OR GANGRENE   SNOMED Code(s): 91584745

## 2022-04-15 NOTE — P.PN
Progress Note - Text


Progress Note Date: 04/15/22





Chief Complaint: Abdominal pain





This is a 71-year-old patient, follows with Dr. Pandya at [facility].  

Chronic stable medical conditions include COPD, hypertension, hyperlipidemia, 

dementia.


Patient herself is not able to give much of a history except for abdominal pain.

 As per the EMS report they will call doubt patient had been complaining of 

abdominal pain.  No fever no chills no nausea vomiting was reported.  Patient 

really cannot tell much.  She is brought into the ER.  Patient denies any fever 

and chills.  Denies any urinary symptoms.  Cannot tell me about her bowel 

pattern.


April 14: ICU: Patient was taken to the OR yesterday by Dr. Vela and had 

surgery for strangulate and internal hernia with small bowel obstruction.  NG 

tube to suction in place.  On room air.  Has an abdominal dressing.  Esteves 

catheter.  Elevated shows sinus rhythm with PACs.  Pain control.  Given Catapres

patch for blood pressure.  Patient was noted to have bloody ascites.  Suspect 

underlying perforation.  Started IV Zosyn.


April 15: Nothing by mouth.  Laying in bed.  IV Zosyn.  IV fluids.  Low-grade 

fever.  Some abdominal pain.  NG tube.





Active Medications





Acetaminophen (Acetaminophen Tab 325 Mg Tab)  650 mg PO Q4H PRN


   PRN Reason: GENERAL DISCOMFORT


   Last Admin: 04/14/22 22:30 Dose:  650 mg


   Documented by: 


Acetaminophen (Acetaminophen Tab 325 Mg Tab)  650 mg PO BID@0800,1700 [facility]


   Last Admin: 04/15/22 17:09 Dose:  650 mg


   Documented by: 


Acetaminophen/Codeine Phosphate (Acetaminophen-Codeine 300-30mg Tab)  1 each PO 

Q12H PRN


   PRN Reason: Pain


Amlodipine Besylate (Amlodipine 5 Mg Tab)  5 mg PO BID [facility]


   Last Admin: 04/15/22 07:44 Dose:  5 mg


   Documented by: 


Cyanocobalamin (Cyanocobalamin 500 Mcg Tab)  500 mcg PO DAILY@1700 [facility]


   Last Admin: 04/15/22 17:09 Dose:  500 mcg


   Documented by: 


Diclofenac Sodium (Diclofenac Sodium Gel 100 Gm Tube)  2 gm TOPICAL QID [facility]; 

Protocol


   Last Admin: 04/15/22 17:10 Dose:  Not Given


   Documented by: 


Ergocalciferol (Ergocalciferol 1,250 Mcg (50,000 Iu) Capsule)  1,250 mcg PO 

SANTORO@1700 [facility]


Famotidine (Famotidine 20 Mg Tab)  20 mg PO DAILY [facility]


   Last Admin: 04/15/22 07:45 Dose:  20 mg


   Documented by: 


Folic Acid (Folic Acid 1 Mg Tab)  1 mg PO DAILY@1700 [facility]


   Last Admin: 04/15/22 17:09 Dose:  1 mg


   Documented by: 


Heparin Sodium (Porcine) (Heparin Sodium 1,000 Un/Ml (10ml Vl))  0 unit IV PER 

PROTOCOL PRN; Protocol


   PRN Reason: Low PTT


Hydromorphone HCl (Hydromorphone 1 Mg/Ml 1 Ml Syringe)  1 mg IVP Q3HR PRN


   PRN Reason: Severe Pain


   Last Admin: 04/13/22 23:21 Dose:  1 mg


   Documented by: 


Heparin Sodium/Sodium Chloride (25,000 unit/ Sodium Chloride)  250 mls @ 10.036 

mls/hr IV .Q24H [facility]; Protocol


   Last Admin: 04/15/22 07:22 Dose:  Not Given


   Documented by: 


Sodium Chloride (Saline 0.9%)  1,000 mls @ 100 mls/hr IV .Q10H [facility]


   Last Admin: 04/15/22 17:09 Dose:  Not Given


   Documented by: 


Piperacillin Sod/Tazobactam (Sod 3.375 gm/ Sodium Chloride)  100 mls @ 25 mls/hr

IVPB Q8HR [facility]; Protocol


   Last Admin: 04/15/22 17:09 Dose:  25 mls/hr


   Documented by: 


Lorazepam (Lorazepam 0.5 Mg Tab)  0.5 mg PO Q6HR PRN


   PRN Reason: Anxiety


Losartan Potassium (Losartan 50 Mg Tab)  50 mg PO DAILY [facility]


   Last Admin: 04/15/22 07:44 Dose:  50 mg


   Documented by: 


Melatonin (Melatonin 3 Mg Tablet)  3 mg PO HS PRN


   PRN Reason: Insomnia


Memantine (Memantine 10 Mg Tab)  10 mg PO BID@0800,1700 [facility]


   Last Admin: 04/15/22 17:09 Dose:  10 mg


   Documented by: 


Metoprolol Tartrate (Metoprolol Tartrate 50 Mg Tab)  50 mg PO Q12H [facility]


Naloxone HCl (Naloxone 0.4 Mg/Ml 1 Ml Vial)  0.2 mg IV Q2M PRN


   PRN Reason: Opioid Reversal


Ondansetron HCl (Ondansetron 4 Mg/2 Ml Vial)  4 mg IVP Q8HR PRN


   PRN Reason: Nausea And Vomiting


Pravastatin Sodium (Pravastatin Sodium 20 Mg Tab)  20 mg PO HS@2100 [facility]


   Last Admin: 04/14/22 21:42 Dose:  20 mg


   Documented by: 











Past medical history to include:


COPD, hypertension, hyperlipidemia, dementia





Social history:


Resident at [facility].  Ex-smoker.





Physical examination:


VITAL SIGNS: T-max 100.3, 103, 17, 121/71, 92% room air


GENERAL: , reclining in bed awake, not in distress.


EYES: Pupils equal.  Conjunctiva normal.


HEENT: External appearance of nose and ears normal, oral cavity dry.  NG tube to

suction


NECK: JVD not raised; masses not palpable.


HEART: First and second heart sounds are normal;  no edema.  


LUNGS: Respiratory rate normal; decreased breath sounds.  


ABDOMEN: Soft, some tenderness, no guarding rigidity, liver spleen not palpable,

no masses palpable.  Dressing over incision.  Bowel sounds present


PSYCH: able to answer simple questions.  


MUSCULOSKELETAL:No Clubbing/cyanosis;muscles-grossly intact.  Evidence of OA.








INVESTIGATIONS, reviewed in the clinical context:


April 15: White count 15 hemoglobin 8.2


April 14: White count 16.3 hemoglobin 8.6 platelets 208 potassium 4.1 creatinine

1.03 lactic acid 1.4


White count 12.3 hemoglobin 13.3 platelets 226 sodium 136 potassium 4.4 

creatinine 0.8 blood glucose 179


Lactic acid 3.3 then 4.7 total bilirubin 1.2 AST 32 ALT 16 lipase 65


UA:: Leukoesterase negative


EKG tracing personally reviewed by me-atrial fibrillation rate 89


Acute abdominal series personally reviewed by me: Check stat x-ray no obvious 

infiltrate.  Dilated bowel appeared to be small


2-D echocardiogram: Mild concentric LVH.  EF 50-60%





Assessment and plan:





-Acute strangulated internal hernia with small bowel obstruction.


Status post surgery by Dr. Vela on April 13.  NG tube to suction.





-Bloody ascites notice during surgery.  Suspect perforation.  Possible secondary

peritonitis.


IV Zosyn





-Acute postprocedure blood loss anemia expected from surgery


IV Ferrlecit 2 doses





-Major cognitive impairment likely from Alzheimer's dementia





-Obesity BMI 39.5





-IV heparin monitoring: Discontinued


Follow PTT





-Primary osteoarthritis multiple joints bilaterally


Voltaren gel 2 g topical 4 times a day, Tylenol 3 when necessary





-Essential hypertension


Lopressor 25 mg twice a day.  Norvasc 5 mg twice a day





-Hyperlipidemia


Pravachol 20 mg daily at bedtime





-Persistent atrial fibrillation,  rate controlled


Lopressor 25 mg twice a day





NG tube to suction.  IV fluids.  By mouth medications..  Nothing by mouth.    

Add IV Zosyn.  Follow labs.

## 2022-04-16 LAB
ANION GAP SERPL CALC-SCNC: 7 MMOL/L
BASOPHILS # BLD AUTO: 0.1 K/UL (ref 0–0.2)
BASOPHILS NFR BLD AUTO: 0 %
BUN SERPL-SCNC: 15 MG/DL (ref 7–17)
CALCIUM SPEC-MCNC: 8 MG/DL (ref 8.4–10.2)
CHLORIDE SERPL-SCNC: 109 MMOL/L (ref 98–107)
CO2 SERPL-SCNC: 24 MMOL/L (ref 22–30)
EOSINOPHIL # BLD AUTO: 0.2 K/UL (ref 0–0.7)
EOSINOPHIL NFR BLD AUTO: 2 %
ERYTHROCYTE [DISTWIDTH] IN BLOOD BY AUTOMATED COUNT: 2.43 M/UL (ref 3.8–5.4)
ERYTHROCYTE [DISTWIDTH] IN BLOOD: 14.6 % (ref 11.5–15.5)
GLUCOSE SERPL-MCNC: 66 MG/DL (ref 74–99)
HCT VFR BLD AUTO: 24 % (ref 34–46)
HGB BLD-MCNC: 7.5 GM/DL (ref 11.4–16)
LYMPHOCYTES # SPEC AUTO: 1.4 K/UL (ref 1–4.8)
LYMPHOCYTES NFR SPEC AUTO: 9 %
MCH RBC QN AUTO: 30.9 PG (ref 25–35)
MCHC RBC AUTO-ENTMCNC: 31.3 G/DL (ref 31–37)
MCV RBC AUTO: 98.7 FL (ref 80–100)
MONOCYTES # BLD AUTO: 0.7 K/UL (ref 0–1)
MONOCYTES NFR BLD AUTO: 5 %
NEUTROPHILS # BLD AUTO: 12.7 K/UL (ref 1.3–7.7)
NEUTROPHILS NFR BLD AUTO: 82 %
PLATELET # BLD AUTO: 241 K/UL (ref 150–450)
POTASSIUM SERPL-SCNC: 3.7 MMOL/L (ref 3.5–5.1)
SODIUM SERPL-SCNC: 140 MMOL/L (ref 137–145)
WBC # BLD AUTO: 15.4 K/UL (ref 3.8–10.6)

## 2022-04-16 RX ADMIN — FAMOTIDINE SCH MG: 20 TABLET, FILM COATED ORAL at 09:29

## 2022-04-16 RX ADMIN — METOPROLOL TARTRATE SCH MG: 25 TABLET, FILM COATED ORAL at 20:31

## 2022-04-16 RX ADMIN — LOSARTAN POTASSIUM SCH MG: 50 TABLET, FILM COATED ORAL at 09:29

## 2022-04-16 RX ADMIN — MEMANTINE HYDROCHLORIDE SCH MG: 10 TABLET ORAL at 09:29

## 2022-04-16 RX ADMIN — CEFAZOLIN SCH MLS/HR: 330 INJECTION, POWDER, FOR SOLUTION INTRAMUSCULAR; INTRAVENOUS at 12:41

## 2022-04-16 RX ADMIN — ACETAMINOPHEN AND CODEINE PHOSPHATE PRN EACH: 300; 30 TABLET ORAL at 20:32

## 2022-04-16 RX ADMIN — PRAVASTATIN SODIUM SCH MG: 20 TABLET ORAL at 20:30

## 2022-04-16 RX ADMIN — MEMANTINE HYDROCHLORIDE SCH MG: 10 TABLET ORAL at 16:20

## 2022-04-16 RX ADMIN — METRONIDAZOLE SCH MLS/HR: 500 INJECTION, SOLUTION INTRAVENOUS at 20:31

## 2022-04-16 RX ADMIN — PIPERACILLIN AND TAZOBACTAM SCH MLS/HR: 3; .375 INJECTION, POWDER, FOR SOLUTION INTRAVENOUS at 16:21

## 2022-04-16 RX ADMIN — CEFAZOLIN SCH MLS/HR: 330 INJECTION, POWDER, FOR SOLUTION INTRAMUSCULAR; INTRAVENOUS at 01:06

## 2022-04-16 RX ADMIN — DICLOFENAC SODIUM SCH GM: 10 GEL TOPICAL at 09:33

## 2022-04-16 RX ADMIN — CEFAZOLIN SCH MLS/HR: 330 INJECTION, POWDER, FOR SOLUTION INTRAMUSCULAR; INTRAVENOUS at 16:19

## 2022-04-16 RX ADMIN — PIPERACILLIN AND TAZOBACTAM SCH MLS/HR: 3; .375 INJECTION, POWDER, FOR SOLUTION INTRAVENOUS at 09:28

## 2022-04-16 RX ADMIN — HEPARIN SODIUM SCH: 10000 INJECTION, SOLUTION INTRAVENOUS at 01:26

## 2022-04-16 RX ADMIN — DICLOFENAC SODIUM SCH: 10 GEL TOPICAL at 17:17

## 2022-04-16 RX ADMIN — PIPERACILLIN AND TAZOBACTAM SCH MLS/HR: 3; .375 INJECTION, POWDER, FOR SOLUTION INTRAVENOUS at 01:06

## 2022-04-16 RX ADMIN — CYANOCOBALAMIN TAB 500 MCG SCH MCG: 500 TAB at 16:19

## 2022-04-16 RX ADMIN — ACETAMINOPHEN PRN MG: 325 TABLET, FILM COATED ORAL at 03:55

## 2022-04-16 RX ADMIN — DICLOFENAC SODIUM SCH GM: 10 GEL TOPICAL at 12:41

## 2022-04-16 RX ADMIN — PIPERACILLIN AND TAZOBACTAM SCH MLS/HR: 3; .375 INJECTION, POWDER, FOR SOLUTION INTRAVENOUS at 23:09

## 2022-04-16 RX ADMIN — DICLOFENAC SODIUM SCH: 10 GEL TOPICAL at 20:32

## 2022-04-16 RX ADMIN — DICLOFENAC SODIUM SCH: 10 GEL TOPICAL at 01:06

## 2022-04-16 RX ADMIN — METOPROLOL TARTRATE SCH MG: 50 TABLET, FILM COATED ORAL at 09:29

## 2022-04-16 RX ADMIN — APIXABAN SCH MG: 5 TABLET, FILM COATED ORAL at 20:31

## 2022-04-16 RX ADMIN — APIXABAN SCH MG: 5 TABLET, FILM COATED ORAL at 12:41

## 2022-04-16 RX ADMIN — ACETAMINOPHEN SCH MG: 325 TABLET, FILM COATED ORAL at 09:29

## 2022-04-16 RX ADMIN — ACETAMINOPHEN SCH MG: 325 TABLET, FILM COATED ORAL at 16:20

## 2022-04-16 RX ADMIN — FOLIC ACID SCH MG: 1 TABLET ORAL at 16:19

## 2022-04-16 NOTE — P.PN
Progress Note - Text


Progress Note Date: 04/16/22





Chief Complaint: Abdominal pain





This is a 71-year-old patient, follows with Dr. Pandya at [facility].  

Chronic stable medical conditions include COPD, hypertension, hyperlipidemia, 

dementia.


Patient herself is not able to give much of a history except for abdominal pain.

 As per the EMS report they will call doubt patient had been complaining of 

abdominal pain.  No fever no chills no nausea vomiting was reported.  Patient 

really cannot tell much.  She is brought into the ER.  Patient denies any fever 

and chills.  Denies any urinary symptoms.  Cannot tell me about her bowel 

pattern.


April 14: ICU: Patient was taken to the OR yesterday by Dr. Vela and had 

surgery for strangulate and internal hernia with small bowel obstruction.  NG 

tube to suction in place.  On room air.  Has an abdominal dressing.  Esteves 

catheter.  Elevated shows sinus rhythm with PACs.  Pain control.  Given Catapres

patch for blood pressure.  Patient was noted to have bloody ascites.  Suspect 

underlying perforation.  Started IV Zosyn.


April 15: Nothing by mouth.  Laying in bed.  IV Zosyn.  IV fluids.  Low-grade 

fever.  Some abdominal pain.  NG tube.


April 16: Remains nothing by mouth.  Abdominal pain.  No BM.  IV Zosyn.  IV 

fluids.  Does not have NG tube.





Active Medications





Acetaminophen (Acetaminophen Tab 325 Mg Tab)  650 mg PO Q4H PRN


   PRN Reason: GENERAL DISCOMFORT


   Last Admin: 04/16/22 03:55 Dose:  650 mg


   Documented by: 


Acetaminophen (Acetaminophen Tab 325 Mg Tab)  650 mg PO BID@0800,1700 [facility]


   Last Admin: 04/16/22 16:20 Dose:  650 mg


   Documented by: 


Acetaminophen/Codeine Phosphate (Acetaminophen-Codeine 300-30mg Tab)  1 each PO 

Q12H PRN


   PRN Reason: Pain


   Last Admin: 04/15/22 20:48 Dose:  1 each


   Documented by: 


Amlodipine Besylate (Amlodipine 5 Mg Tab)  5 mg PO BID [facility]


   Last Admin: 04/16/22 09:29 Dose:  5 mg


   Documented by: 


Apixaban (Apixaban 5 Mg Tab)  5 mg PO BID [facility]; Protocol


   Last Admin: 04/16/22 12:41 Dose:  5 mg


   Documented by: 


Cyanocobalamin (Cyanocobalamin 500 Mcg Tab)  500 mcg PO DAILY@1700 [facility]


   Last Admin: 04/16/22 16:19 Dose:  500 mcg


   Documented by: 


Diclofenac Sodium (Diclofenac Sodium Gel 100 Gm Tube)  2 gm TOPICAL QID [facility]; 

Protocol


   Last Admin: 04/16/22 17:17 Dose:  Not Given


   Documented by: 


Ergocalciferol (Ergocalciferol 1,250 Mcg (50,000 Iu) Capsule)  1,250 mcg PO 

SANTORO@1700 [facility]


Famotidine (Famotidine 20 Mg Tab)  20 mg PO DAILY [facility]


   Last Admin: 04/16/22 09:29 Dose:  20 mg


   Documented by: 


Folic Acid (Folic Acid 1 Mg Tab)  1 mg PO DAILY@1700 [facility]


   Last Admin: 04/16/22 16:19 Dose:  1 mg


   Documented by: 


Hydromorphone HCl (Hydromorphone 1 Mg/Ml 1 Ml Syringe)  1 mg IVP Q3HR PRN


   PRN Reason: Severe Pain


   Last Admin: 04/13/22 23:21 Dose:  1 mg


   Documented by: 


Sodium Chloride (Saline 0.9%)  1,000 mls @ 100 mls/hr IV .Q10H [facility]


   Last Admin: 04/16/22 16:19 Dose:  100 mls/hr


   Documented by: 


Piperacillin Sod/Tazobactam (Sod 3.375 gm/ Sodium Chloride)  100 mls @ 25 mls/hr

IVPB Q8HR [facility]; Protocol


   Last Admin: 04/16/22 16:21 Dose:  25 mls/hr


   Documented by: 


Lorazepam (Lorazepam 0.5 Mg Tab)  0.5 mg PO Q6HR PRN


   PRN Reason: Anxiety


Losartan Potassium (Losartan 50 Mg Tab)  50 mg PO DAILY [facility]


   Last Admin: 04/16/22 09:29 Dose:  50 mg


   Documented by: 


Melatonin (Melatonin 3 Mg Tablet)  3 mg PO HS PRN


   PRN Reason: Insomnia


Memantine (Memantine 10 Mg Tab)  10 mg PO BID@0800,1700 [facility]


   Last Admin: 04/16/22 16:20 Dose:  10 mg


   Documented by: 


Metoprolol Tartrate (Metoprolol Tartrate 25 Mg Tab)  75 mg PO Q12H [facility]


Naloxone HCl (Naloxone 0.4 Mg/Ml 1 Ml Vial)  0.2 mg IV Q2M PRN


   PRN Reason: Opioid Reversal


Ondansetron HCl (Ondansetron 4 Mg/2 Ml Vial)  4 mg IVP Q8HR PRN


   PRN Reason: Nausea And Vomiting


Pravastatin Sodium (Pravastatin Sodium 20 Mg Tab)  20 mg PO HS@2100 [facility]


   Last Admin: 04/15/22 20:48 Dose:  20 mg


   Documented by: 














Past medical history to include:


COPD, hypertension, hyperlipidemia, dementia





Social history:


Resident at [facility].  Ex-smoker.





Physical examination:


VITAL SIGNS: 99, 89, 18, 1:30/78, 95% room air


GENERAL: , reclining in bed awake,


EYES: Pupils equal.  Conjunctiva normal.


HEENT: External appearance of nose and ears normal, oral cavity dry. 


NECK: JVD not raised; masses not palpable.


HEART: First and second heart sounds are normal;  no edema.  


LUNGS: Respiratory rate normal; decreased breath sounds.  


ABDOMEN: Soft, some tenderness, no guarding rigidity, liver spleen not palpable,

no masses palpable.  Dressing over incision.  Bowel sounds present


PSYCH: able to answer simple questions.  


MUSCULOSKELETAL:No Clubbing/cyanosis;muscles-grossly intact.  Evidence of OA.








INVESTIGATIONS, reviewed in the clinical context:


April 16: White count 15.4 hemoglobin 7.5 potassium 3.7 creatinine 0.91


April 15: White count 15 hemoglobin 8.2


April 14: White count 16.3 hemoglobin 8.6 platelets 208 potassium 4.1 creatinine

1.03 lactic acid 1.4


White count 12.3 hemoglobin 13.3 platelets 226 sodium 136 potassium 4.4 

creatinine 0.8 blood glucose 179


Lactic acid 3.3 then 4.7 total bilirubin 1.2 AST 32 ALT 16 lipase 65


UA:: Leukoesterase negative


EKG tracing personally reviewed by me-atrial fibrillation rate 89


Acute abdominal series personally reviewed by me: Check stat x-ray no obvious 

infiltrate.  Dilated bowel appeared to be small


2-D echocardiogram: Mild concentric LVH.  EF 50-60%





Assessment and plan:





-Acute strangulated internal hernia with small bowel obstruction.


Status post surgery by Dr. Vela on April 13.  





-Bloody ascites notice during surgery.  Suspect perforation.  Possible secondary

peritonitis.


IV Zosyn





-Acute postprocedure blood loss anemia expected from surgery: Worsening


IV Ferrlecit 2 doses.  Follow H&H.





-Major cognitive impairment likely from Alzheimer's dementia





-Obesity BMI 39.5





-IV heparin monitoring: Discontinued


Follow PTT





-Primary osteoarthritis multiple joints bilaterally


Voltaren gel 2 g topical 4 times a day, Tylenol 3 when necessary





-Essential hypertension


Lopressor 25 mg twice a day.  Norvasc 5 mg twice a day





-Hyperlipidemia


Pravachol 20 mg daily at bedtime





-Persistent atrial fibrillation,  rate controlled


Lopressor 25 mg twice a day





Nothing by mouth.  IV fluids.  By mouth medications.. IV Zosyn.  Add IV Flagyl. 

Follow labs.

## 2022-04-16 NOTE — P.PN
Subjective





This is a 71-year-old female patient with past medical history of dementia, 

COPD, hypertension, dyslipidemia and chronic lower extremity edema.  She is a 

former smoker.  She does not follow regularly with the cardiologist.  She was 

brought to the emergency department from [facility] for worsening upper abdominal 

pain.  We were asked to see the patient in consultation for atrial fibrillation,

new onset.  CT scan on admission showed bowel obstruction versus ileus.  She is 

physically underwent exploratory laparotomy and small bowel resection and repair

of incisional hernia.  She was initially on Cardizem drip and has been 

transitioned to oral beta blocker.  She is currently maintaining sinus 

mechanism.  Heart rate is currently in the 90s.  Heart pressure is stable.  Ex 

temperature over the last 24 hours 100.3F.  Echocardiogram with Doppler study 

this admission showed normal LV systolic function.  Laboratory values from 

yesterday showed a white blood cell count of 16,300 and hemoglobin of 8.6 which 

is down from 13.3.  Potassium 4.1, BUN 20 creatinine 1.03.  At the time of my 

examination she is resting comfortably in bed.  She denies any current 

complaints.  She is confused as she says that she ate breakfast this morning 

however she has been nothing by mouth.





04/16/2021


Pt seen and examined sitting up in bed in no acute distress. She has no symptoms

of chest pain and breathing is stable. She denies palpitations. Telemetry 

reveals she is in SR with heart rate of 107. Currently maintained on amlodipine 

5 mg BID, losartan 50 mg daily, pravastatin 20 mg at HS and metoprolol 50 mg 

BID. Blood pressure 136/82 heart rate 96. She is still NPO and not receiving 

anti-coagulation. 





PHYSICAL EXAMINATION: 


HEENT: Head is atraumatic, normocephalic.  Pupils equal, round.  Neck is supple.

 There is no elevated jugular venous pressure.


HEART EXAMINATION: Heart sounds regular, S1 and S2 normal.  No murmur or gallop 

heard.


CHEST EXAMINATION: Lungs are clear to auscultation and precussion. No chest wall

tenderness is noted on palpation or with deep breathing. 


EXTREMITIES: 2+ peripheral pulses with no evidence of peripheral edema and no 

calf tenderness noted.





Assessment: 


#1 bowel obstruction, status post exploratory laparotomy with small bowel 

resection and incisional hernia repair


#2 paroxysmal atrial fibrillation, [id number] score 3


#3 hypertension


#4 dementia


#5 dyslipidemia


#6 chronic lower extremity edema





Plan: 


Start Eliquis 5mg BID, spoke with Dr. Malagon and he is ok with this. Follow CBC 

in am. 


Increase beta blocker to 75 mg BID, given an additional 25 mg now.  


Further recommendations to follow based on clinical course. 





NP note has been reviewed, I agree with a documented findings and plan of care. 

Patient was seen and examined.





Objective





- Vital Signs


Vital signs: 


                                   Vital Signs











Temp  99.4 F   04/16/22 02:00


 


Pulse  96   04/16/22 02:00


 


Resp  18   04/16/22 02:00


 


BP  136/82   04/16/22 02:00


 


Pulse Ox  97   04/16/22 02:00








                                 Intake & Output











 04/15/22 04/16/22 04/16/22





 18:59 06:59 18:59


 


Output Total 950 400 


 


Balance -950 -400 


 


Output:   


 


  Urine 950 400 


 


Other:   


 


  Voiding Method Indwelling Catheter Indwelling Catheter 














- Labs


CBC & Chem 7: 


                                 04/16/22 05:21





                                 04/16/22 05:21


Labs: 


                  Abnormal Lab Results - Last 24 Hours (Table)











  04/15/22 04/16/22 04/16/22 Range/Units





  12:07 05:21 05:21 


 


WBC  15.0 H  15.4 H   (3.8-10.6)  k/uL


 


RBC  2.62 L  2.43 L   (3.80-5.40)  m/uL


 


Hgb  8.2 L  7.5 L   (11.4-16.0)  gm/dL


 


Hct  26.0 L  24.0 L   (34.0-46.0)  %


 


Neutrophils #   12.7 H   (1.3-7.7)  k/uL


 


Chloride    109 H  ()  mmol/L


 


Glucose    66 L  (74-99)  mg/dL


 


Calcium    8.0 L  (8.4-10.2)  mg/dL

## 2022-04-16 NOTE — P.PN
Subjective


Progress Note Date: 04/16/22


Principal diagnosis: 





Internal hernia





Patient doing well today.  Denies pain.  No flatus or bowel movement.  Says that

she was brought food today.  Not sure if she is confused about that or not.





Objective





- Vital Signs


Vital signs: 


                                   Vital Signs











Temp  99.3 F   04/16/22 08:00


 


Pulse  103 H  04/16/22 08:00


 


Resp  16   04/16/22 08:00


 


BP  141/66   04/16/22 08:00


 


Pulse Ox  93 L  04/16/22 08:00








                                 Intake & Output











 04/15/22 04/16/22 04/16/22





 18:59 06:59 18:59


 


Output Total 950 400 


 


Balance -950 -400 


 


Output:   


 


  Urine 950 400 


 


Other:   


 


  Voiding Method Indwelling Catheter Indwelling Catheter 














- Exam





Abdomen: Soft, nondistended, incision clean dry, minimal tenderness





- Labs


CBC & Chem 7: 


                                 04/16/22 05:21





                                 04/16/22 05:21


Labs: 


                  Abnormal Lab Results - Last 24 Hours (Table)











  04/15/22 04/16/22 04/16/22 Range/Units





  12:07 05:21 05:21 


 


WBC  15.0 H  15.4 H   (3.8-10.6)  k/uL


 


RBC  2.62 L  2.43 L   (3.80-5.40)  m/uL


 


Hgb  8.2 L  7.5 L   (11.4-16.0)  gm/dL


 


Hct  26.0 L  24.0 L   (34.0-46.0)  %


 


Neutrophils #   12.7 H   (1.3-7.7)  k/uL


 


Chloride    109 H  ()  mmol/L


 


Glucose    66 L  (74-99)  mg/dL


 


Calcium    8.0 L  (8.4-10.2)  mg/dL














Assessment and Plan


(1) Internal hernia


Narrative/Plan: 


Patient doing well at this time.  Continue nothing by mouth for now.  Increase 

activity.  Recheck labs tomorrow.


Current Visit: Yes   Status: Acute   Code(s): K45.8 - OTH ABDOMINAL HERNIA 

WITHOUT OBSTRUCTION OR GANGRENE   SNOMED Code(s): 47105640

## 2022-04-17 LAB
ANION GAP SERPL CALC-SCNC: 13 MMOL/L
BASOPHILS # BLD AUTO: 0 K/UL (ref 0–0.2)
BASOPHILS NFR BLD AUTO: 0 %
BUN SERPL-SCNC: 14 MG/DL (ref 7–17)
CALCIUM SPEC-MCNC: 8.4 MG/DL (ref 8.4–10.2)
CHLORIDE SERPL-SCNC: 109 MMOL/L (ref 98–107)
CO2 SERPL-SCNC: 20 MMOL/L (ref 22–30)
EOSINOPHIL # BLD AUTO: 0.6 K/UL (ref 0–0.7)
EOSINOPHIL NFR BLD AUTO: 4 %
ERYTHROCYTE [DISTWIDTH] IN BLOOD BY AUTOMATED COUNT: 2.55 M/UL (ref 3.8–5.4)
ERYTHROCYTE [DISTWIDTH] IN BLOOD: 14.3 % (ref 11.5–15.5)
GLUCOSE BLD-MCNC: 116 MG/DL (ref 75–99)
GLUCOSE SERPL-MCNC: 62 MG/DL (ref 74–99)
HCT VFR BLD AUTO: 25.5 % (ref 34–46)
HGB BLD-MCNC: 8.1 GM/DL (ref 11.4–16)
LYMPHOCYTES # SPEC AUTO: 1.5 K/UL (ref 1–4.8)
LYMPHOCYTES NFR SPEC AUTO: 10 %
MCH RBC QN AUTO: 31.6 PG (ref 25–35)
MCHC RBC AUTO-ENTMCNC: 31.6 G/DL (ref 31–37)
MCV RBC AUTO: 100 FL (ref 80–100)
MONOCYTES # BLD AUTO: 0.8 K/UL (ref 0–1)
MONOCYTES NFR BLD AUTO: 6 %
NEUTROPHILS # BLD AUTO: 11.5 K/UL (ref 1.3–7.7)
NEUTROPHILS NFR BLD AUTO: 78 %
PLATELET # BLD AUTO: 299 K/UL (ref 150–450)
POTASSIUM SERPL-SCNC: 3.9 MMOL/L (ref 3.5–5.1)
SODIUM SERPL-SCNC: 142 MMOL/L (ref 137–145)
WBC # BLD AUTO: 14.7 K/UL (ref 3.8–10.6)

## 2022-04-17 RX ADMIN — METOPROLOL TARTRATE SCH MG: 25 TABLET, FILM COATED ORAL at 19:42

## 2022-04-17 RX ADMIN — DEXTROSE MONOHYDRATE AND SODIUM CHLORIDE SCH MLS/HR: 5; .9 INJECTION, SOLUTION INTRAVENOUS at 21:28

## 2022-04-17 RX ADMIN — DICLOFENAC SODIUM SCH GM: 10 GEL TOPICAL at 07:55

## 2022-04-17 RX ADMIN — DEXTROSE MONOHYDRATE AND SODIUM CHLORIDE SCH MLS/HR: 5; .9 INJECTION, SOLUTION INTRAVENOUS at 12:12

## 2022-04-17 RX ADMIN — PIPERACILLIN AND TAZOBACTAM SCH MLS/HR: 3; .375 INJECTION, POWDER, FOR SOLUTION INTRAVENOUS at 23:06

## 2022-04-17 RX ADMIN — ACETAMINOPHEN AND CODEINE PHOSPHATE PRN EACH: 300; 30 TABLET ORAL at 05:23

## 2022-04-17 RX ADMIN — APIXABAN SCH MG: 5 TABLET, FILM COATED ORAL at 07:55

## 2022-04-17 RX ADMIN — FOLIC ACID SCH MG: 1 TABLET ORAL at 15:23

## 2022-04-17 RX ADMIN — ACETAMINOPHEN AND CODEINE PHOSPHATE PRN EACH: 300; 30 TABLET ORAL at 20:38

## 2022-04-17 RX ADMIN — LOSARTAN POTASSIUM SCH MG: 50 TABLET, FILM COATED ORAL at 07:55

## 2022-04-17 RX ADMIN — METRONIDAZOLE SCH MLS/HR: 500 INJECTION, SOLUTION INTRAVENOUS at 19:41

## 2022-04-17 RX ADMIN — MEMANTINE HYDROCHLORIDE SCH MG: 10 TABLET ORAL at 15:23

## 2022-04-17 RX ADMIN — Medication SCH GM: at 17:07

## 2022-04-17 RX ADMIN — METRONIDAZOLE SCH MLS/HR: 500 INJECTION, SOLUTION INTRAVENOUS at 14:15

## 2022-04-17 RX ADMIN — Medication PRN MG: at 20:38

## 2022-04-17 RX ADMIN — CEFAZOLIN SCH MLS/HR: 330 INJECTION, POWDER, FOR SOLUTION INTRAMUSCULAR; INTRAVENOUS at 05:23

## 2022-04-17 RX ADMIN — CYANOCOBALAMIN TAB 500 MCG SCH MCG: 500 TAB at 15:23

## 2022-04-17 RX ADMIN — Medication SCH GM: at 12:12

## 2022-04-17 RX ADMIN — Medication SCH GM: at 20:58

## 2022-04-17 RX ADMIN — DICLOFENAC SODIUM SCH GM: 10 GEL TOPICAL at 12:13

## 2022-04-17 RX ADMIN — METRONIDAZOLE SCH MLS/HR: 500 INJECTION, SOLUTION INTRAVENOUS at 03:14

## 2022-04-17 RX ADMIN — Medication SCH GM: at 12:13

## 2022-04-17 RX ADMIN — METOPROLOL TARTRATE SCH MG: 25 TABLET, FILM COATED ORAL at 07:54

## 2022-04-17 RX ADMIN — ACETAMINOPHEN SCH MG: 325 TABLET, FILM COATED ORAL at 07:54

## 2022-04-17 RX ADMIN — DICLOFENAC SODIUM SCH GM: 10 GEL TOPICAL at 20:57

## 2022-04-17 RX ADMIN — ACETAMINOPHEN PRN MG: 325 TABLET, FILM COATED ORAL at 00:54

## 2022-04-17 RX ADMIN — PIPERACILLIN AND TAZOBACTAM SCH MLS/HR: 3; .375 INJECTION, POWDER, FOR SOLUTION INTRAVENOUS at 15:23

## 2022-04-17 RX ADMIN — PIPERACILLIN AND TAZOBACTAM SCH MLS/HR: 3; .375 INJECTION, POWDER, FOR SOLUTION INTRAVENOUS at 09:09

## 2022-04-17 RX ADMIN — PRAVASTATIN SODIUM SCH MG: 20 TABLET ORAL at 19:42

## 2022-04-17 RX ADMIN — FAMOTIDINE SCH MG: 20 TABLET, FILM COATED ORAL at 07:55

## 2022-04-17 RX ADMIN — ACETAMINOPHEN SCH MG: 325 TABLET, FILM COATED ORAL at 15:23

## 2022-04-17 RX ADMIN — APIXABAN SCH MG: 5 TABLET, FILM COATED ORAL at 19:54

## 2022-04-17 RX ADMIN — DICLOFENAC SODIUM SCH: 10 GEL TOPICAL at 21:03

## 2022-04-17 RX ADMIN — METRONIDAZOLE SCH MLS/HR: 500 INJECTION, SOLUTION INTRAVENOUS at 07:53

## 2022-04-17 RX ADMIN — DICLOFENAC SODIUM SCH GM: 10 GEL TOPICAL at 17:06

## 2022-04-17 RX ADMIN — MEMANTINE HYDROCHLORIDE SCH MG: 10 TABLET ORAL at 07:55

## 2022-04-17 NOTE — P.PN
Subjective


Progress Note Date: 04/17/22


Principal diagnosis: 





Internal hernia





Patient without new complaints.  Mild abdominal pain.  No nausea or vomiting.  

No bowel function thus far.  T-max 100.  White blood cell count 14.7.





Objective





- Vital Signs


Vital signs: 


                                   Vital Signs











Temp  98.8 F   04/17/22 08:00


 


Pulse  80   04/17/22 08:00


 


Resp  16   04/17/22 08:00


 


BP  165/83   04/17/22 08:00


 


Pulse Ox  96   04/17/22 08:00








                                 Intake & Output











 04/16/22 04/17/22 04/17/22





 18:59 06:59 18:59


 


Intake Total 900  


 


Output Total 700 500 


 


Balance 200 -500 


 


Intake:   


 


  Intake, IV Titration 900  





  Amount   


 


    Piperacillin-Tazobactam 3 100  





    .375 gm In Sodium   





    Chloride 0.9% 100 ml @ 25   





    mls/hr IVPB Q8HR [facility] Rx#   





    :[id number]   


 


    Sodium Chloride 0.9% 1, 800  





    000 ml @ 100 mls/hr IV .   





    Q10H [facility] Rx#:[id number]   


 


Output:   


 


  Urine 700 500 


 


Other:   


 


  Voiding Method Indwelling Catheter Indwelling Catheter 














- Exam





Abdomen: Soft, nondistended, incision with 4 separate wick sites draining small 

amount of serous fluid minimal tenderness





- Labs


CBC & Chem 7: 


                                 04/17/22 04:52





                                 04/17/22 04:52


Labs: 


                  Abnormal Lab Results - Last 24 Hours (Table)











  04/17/22 04/17/22 Range/Units





  04:52 04:52 


 


WBC  14.7 H   (3.8-10.6)  k/uL


 


RBC  2.55 L   (3.80-5.40)  m/uL


 


Hgb  8.1 L   (11.4-16.0)  gm/dL


 


Hct  25.5 L   (34.0-46.0)  %


 


Neutrophils #  11.5 H   (1.3-7.7)  k/uL


 


Chloride   109 H  ()  mmol/L


 


Carbon Dioxide   20 L  (22-30)  mmol/L


 


Glucose   62 L  (74-99)  mg/dL














Assessment and Plan


(1) Internal hernia


Narrative/Plan: 


Patient still not having significant bowel function.  Keep nothing by mouth for 

now.  Continue antibiotics.  Change fermin today.


Current Visit: Yes   Status: Acute   Code(s): K45.8 - OTH ABDOMINAL HERNIA 

WITHOUT OBSTRUCTION OR GANGRENE   SNOMED Code(s): 23398197

## 2022-04-17 NOTE — P.PN
Subjective





This is a 71-year-old female patient with past medical history of dementia, 

COPD, hypertension, dyslipidemia and chronic lower extremity edema.  She is a 

former smoker.  She does not follow regularly with the cardiologist.  She was 

brought to the emergency department from [facility] for worsening upper abdominal 

pain.  We were asked to see the patient in consultation for atrial fibrillation,

new onset.  CT scan on admission showed bowel obstruction versus ileus.  She is 

physically underwent exploratory laparotomy and small bowel resection and repair

of incisional hernia.  She was initially on Cardizem drip and has been 

transitioned to oral beta blocker.  She is currently maintaining sinus 

mechanism.  Heart rate is currently in the 90s.  Heart pressure is stable.  Ex 

temperature over the last 24 hours 100.3F.  Echocardiogram with Doppler study 

this admission showed normal LV systolic function.  Laboratory values from 

yesterday showed a white blood cell count of 16,300 and hemoglobin of 8.6 which 

is down from 13.3.  Potassium 4.1, BUN 20 creatinine 1.03.  At the time of my 

examination she is resting comfortably in bed.  She denies any current 

complaints.  She is confused as she says that she ate breakfast this morning 

however she has been nothing by mouth.





04/17/2022


Pt seen and examined sitting up in bed quite drowsy.  She did receive some 

Ativan per the nurse for agitation through the night.  She is maintaining sinus 

rhythm in the 80s. Eliquis was started yesterday and hemoglobin is stable.  

Blood pressure 165/83 heart rate 88 afebrile maintaining oxygen saturation on 

room air.





PHYSICAL EXAMINATION: 


HEENT: Head is atraumatic, normocephalic.  Pupils equal, round.  Neck is supple.

 There is no elevated jugular venous pressure.


HEART EXAMINATION: Heart sounds regular, S1 and S2 normal.  No murmur or gallop 

heard.


CHEST EXAMINATION: Lungs are clear to auscultation and precussion. No chest wall

tenderness is noted on palpation or with deep breathing. 


EXTREMITIES: 2+ peripheral pulses with no evidence of peripheral edema and no 

calf tenderness noted.





Assessment: 


#1 bowel obstruction, status post exploratory laparotomy with small bowel 

resection and incisional hernia repair


#2 paroxysmal atrial fibrillation, [id number] score 3


#3 hypertension


#4 dementia


#5 dyslipidemia


#6 chronic lower extremity edema





Plan: 


Continue current medical regimen. 


We will follow along as needed, follow up with  upon discharge.  





NP note has been reviewed, I agree with a documented findings and plan of care. 

Patient was seen and examined.





Objective





- Vital Signs


Vital signs: 


                                   Vital Signs











Temp  98.8 F   04/17/22 08:00


 


Pulse  80   04/17/22 08:00


 


Resp  16   04/17/22 08:00


 


BP  165/83   04/17/22 08:00


 


Pulse Ox  96   04/17/22 08:00








                                 Intake & Output











 04/16/22 04/17/22 04/17/22





 18:59 06:59 18:59


 


Intake Total 900  


 


Output Total 700 500 


 


Balance 200 -500 


 


Intake:   


 


  Intake, IV Titration 900  





  Amount   


 


    Piperacillin-Tazobactam 3 100  





    .375 gm In Sodium   





    Chloride 0.9% 100 ml @ 25   





    mls/hr IVPB Q8HR [facility] Rx#   





    :[id number]   


 


    Sodium Chloride 0.9% 1, 800  





    000 ml @ 100 mls/hr IV .   





    Q10H [facility] Rx#:[id number]   


 


Output:   


 


  Urine 700 500 


 


Other:   


 


  Voiding Method Indwelling Catheter Indwelling Catheter 














- Labs


CBC & Chem 7: 


                                 04/17/22 04:52





                                 04/17/22 04:52


Labs: 


                  Abnormal Lab Results - Last 24 Hours (Table)











  04/17/22 04/17/22 04/17/22 Range/Units





  04:52 04:52 04:52 


 


WBC   14.7 H   (3.8-10.6)  k/uL


 


RBC   2.55 L   (3.80-5.40)  m/uL


 


Hgb   8.1 L   (11.4-16.0)  gm/dL


 


Hct   25.5 L   (34.0-46.0)  %


 


Neutrophils #   11.5 H   (1.3-7.7)  k/uL


 


Chloride    109 H  ()  mmol/L


 


Carbon Dioxide    20 L  (22-30)  mmol/L


 


Glucose    62 L  (74-99)  mg/dL


 


Procalcitonin  0.71 H    (0.02-0.09)  ng/mL

## 2022-04-17 NOTE — P.PN
Progress Note - Text


Progress Note Date: 04/17/22





Chief Complaint: Abdominal pain





This is a 71-year-old patient, follows with Dr. Pandya at [facility].  

Chronic stable medical conditions include COPD, hypertension, hyperlipidemia, 

dementia.


Patient herself is not able to give much of a history except for abdominal pain.

 As per the EMS report they will call doubt patient had been complaining of 

abdominal pain.  No fever no chills no nausea vomiting was reported.  Patient 

really cannot tell much.  She is brought into the ER.  Patient denies any fever 

and chills.  Denies any urinary symptoms.  Cannot tell me about her bowel 

pattern.


April 14: ICU: Patient was taken to the OR yesterday by Dr. Vela and had 

surgery for strangulate and internal hernia with small bowel obstruction.  NG 

tube to suction in place.  On room air.  Has an abdominal dressing.  Esteves 

catheter.  Elevated shows sinus rhythm with PACs.  Pain control.  Given Catapres

patch for blood pressure.  Patient was noted to have bloody ascites.  Suspect 

underlying perforation.  Started IV Zosyn.


April 15: Nothing by mouth.  Laying in bed.  IV Zosyn.  IV fluids.  Low-grade 

fever.  Some abdominal pain.  NG tube.


April 16: Remains nothing by mouth.  Abdominal pain.  No BM.  IV Zosyn.  IV 

fluids.  Does not have NG tube.


April 17: Remains nothing by mouth.  Decrease abdominal pain.  No BM or flatus  

reported.  Has bowel sounds.





Active Medications





Acetaminophen (Acetaminophen Tab 325 Mg Tab)  650 mg PO Q4H PRN


   PRN Reason: GENERAL DISCOMFORT


   Last Admin: 04/17/22 00:54 Dose:  650 mg


   Documented by: 


Acetaminophen (Acetaminophen Tab 325 Mg Tab)  650 mg PO BID@0800,1700 [facility]


   Last Admin: 04/17/22 07:54 Dose:  650 mg


   Documented by: 


Acetaminophen/Codeine Phosphate (Acetaminophen-Codeine 300-30mg Tab)  1 each PO 

Q12H PRN


   PRN Reason: Pain


   Last Admin: 04/17/22 05:23 Dose:  1 each


   Documented by: 


Amlodipine Besylate (Amlodipine 5 Mg Tab)  5 mg PO BID [facility]


   Last Admin: 04/17/22 07:55 Dose:  5 mg


   Documented by: 


Apixaban (Apixaban 5 Mg Tab)  5 mg PO BID [facility]; Protocol


   Last Admin: 04/17/22 07:55 Dose:  5 mg


   Documented by: 


Cyanocobalamin (Cyanocobalamin 500 Mcg Tab)  500 mcg PO DAILY@1700 [facility]


   Last Admin: 04/16/22 16:19 Dose:  500 mcg


   Documented by: 


Diclofenac Sodium (Diclofenac Sodium Gel 100 Gm Tube)  2 gm TOPICAL QID [facility]; 

Protocol


   Last Admin: 04/17/22 07:55 Dose:  2 gm


   Documented by: 


Ergocalciferol (Ergocalciferol 1,250 Mcg (50,000 Iu) Capsule)  1,250 mcg PO 

SANTORO@1700 [facility]


Famotidine (Famotidine 20 Mg Tab)  20 mg PO DAILY [facility]


   Last Admin: 04/17/22 07:55 Dose:  20 mg


   Documented by: 


Folic Acid (Folic Acid 1 Mg Tab)  1 mg PO DAILY@1700 [facility]


   Last Admin: 04/16/22 16:19 Dose:  1 mg


   Documented by: 


Hydromorphone HCl (Hydromorphone 1 Mg/Ml 1 Ml Syringe)  1 mg IVP Q3HR PRN


   PRN Reason: Severe Pain


   Last Admin: 04/13/22 23:21 Dose:  1 mg


   Documented by: 


Sodium Chloride (Saline 0.9%)  1,000 mls @ 100 mls/hr IV .Q10H [facility]


   Last Admin: 04/17/22 05:23 Dose:  100 mls/hr


   Documented by: 


Piperacillin Sod/Tazobactam (Sod 3.375 gm/ Sodium Chloride)  100 mls @ 25 mls/hr

IVPB Q8HR [facility]; Protocol


   Last Admin: 04/17/22 09:09 Dose:  25 mls/hr


   Documented by: 


Metronidazole 500 mg/ IV (Solution)  100 mls @ 100 mls/hr IVPB Q6H [facility]; Protocol


   Last Admin: 04/17/22 07:53 Dose:  100 mls/hr


   Documented by: 


Lorazepam (Lorazepam 0.5 Mg Tab)  0.5 mg PO Q6HR PRN


   PRN Reason: Anxiety


   Last Admin: 04/17/22 05:26 Dose:  0.5 mg


   Documented by: 


Losartan Potassium (Losartan 50 Mg Tab)  50 mg PO DAILY [facility]


   Last Admin: 04/17/22 07:55 Dose:  50 mg


   Documented by: 


Melatonin (Melatonin 3 Mg Tablet)  3 mg PO HS PRN


   PRN Reason: Insomnia


Memantine (Memantine 10 Mg Tab)  10 mg PO BID@0800,1700 [facility]


   Last Admin: 04/17/22 07:55 Dose:  10 mg


   Documented by: 


Metoprolol Tartrate (Metoprolol Tartrate 25 Mg Tab)  75 mg PO Q12H [facility]


   Last Admin: 04/17/22 07:54 Dose:  75 mg


   Documented by: 


Naloxone HCl (Naloxone 0.4 Mg/Ml 1 Ml Vial)  0.2 mg IV Q2M PRN


   PRN Reason: Opioid Reversal


Ondansetron HCl (Ondansetron 4 Mg/2 Ml Vial)  4 mg IVP Q8HR PRN


   PRN Reason: Nausea And Vomiting


Pravastatin Sodium (Pravastatin Sodium 20 Mg Tab)  20 mg PO HS@2100 [facility]


   Last Admin: 04/16/22 20:30 Dose:  20 mg


   Documented by: 














Past medical history to include:


COPD, hypertension, hyperlipidemia, dementia





Social history:


Resident at [facility].  Ex-smoker.





Physical examination:


VITAL SIGNS: 98.8, 80, 16, 1 6583, 96% room air


GENERAL: , reclining in bed awake,


EYES: Pupils equal.  Conjunctiva normal.


HEENT: External appearance of nose and ears normal, oral cavity dry. 


NECK: JVD not raised; masses not palpable.


HEART: First and second heart sounds are normal;  no edema.  


LUNGS: Respiratory rate normal; decreased breath sounds.  


ABDOMEN: Soft, some tenderness, no guarding rigidity, liver spleen not palpable,

no masses palpable.  Dressing over incision.  Bowel sounds present


PSYCH: able to answer simple questions.  


MUSCULOSKELETAL:No Clubbing/cyanosis;muscles-grossly intact.  Evidence of OA.








INVESTIGATIONS, reviewed in the clinical context:


April 17: White count 14.7 hemoglobin 8.1 platelets 299 potassium 3.9 creatinine

0.75 pro-calcitonin 0.71


April 16: White count 15.4 hemoglobin 7.5 potassium 3.7 creatinine 0.91


April 15: White count 15 hemoglobin 8.2


April 14: White count 16.3 hemoglobin 8.6 platelets 208 potassium 4.1 creatinine

1.03 lactic acid 1.4


White count 12.3 hemoglobin 13.3 platelets 226 sodium 136 potassium 4.4 

creatinine 0.8 blood glucose 179


Lactic acid 3.3 then 4.7 total bilirubin 1.2 AST 32 ALT 16 lipase 65


UA:: Leukoesterase negative


EKG tracing personally reviewed by me-atrial fibrillation rate 89


Acute abdominal series personally reviewed by me: Check stat x-ray no obvious 

infiltrate.  Dilated bowel appeared to be small


2-D echocardiogram: Mild concentric LVH.  EF 50-60%





Assessment and plan:





-Acute strangulated internal hernia with small bowel obstruction.


Status post surgery by Dr. Vela on April 13.  





-Bloody ascites notice during surgery.  Suspect perforation.  Possible secondary

peritonitis.


IV Zosyn.  IV Flagyl





-Acute postprocedure blood loss anemia expected from surgery: 


IV Ferrlecit 2 doses.  Follow H&H.





-Major cognitive impairment likely from Alzheimer's dementia





-Obesity BMI 39.5





-IV heparin monitoring: Discontinued


Follow PTT





-Primary osteoarthritis multiple joints bilaterally


Voltaren gel 2 g topical 4 times a day, Tylenol 3 when necessary





-Essential hypertension


Lopressor 25 mg twice a day.  Norvasc 5 mg twice a day





-Hyperlipidemia


Pravachol 20 mg daily at bedtime





-Persistent atrial fibrillation,  rate controlled


Lopressor 25 mg twice a day





Nothing by mouth.  IV fluids.  Oral medications.. IV Zosyn. / IV Flagyl.  Follow

labs and with surgery.

## 2022-04-18 RX ADMIN — DICLOFENAC SODIUM SCH GM: 10 GEL TOPICAL at 08:37

## 2022-04-18 RX ADMIN — METOPROLOL TARTRATE SCH MG: 25 TABLET, FILM COATED ORAL at 08:32

## 2022-04-18 RX ADMIN — ACETAMINOPHEN SCH MG: 325 TABLET, FILM COATED ORAL at 08:32

## 2022-04-18 RX ADMIN — Medication SCH GM: at 08:35

## 2022-04-18 RX ADMIN — METRONIDAZOLE SCH MLS/HR: 500 INJECTION, SOLUTION INTRAVENOUS at 21:32

## 2022-04-18 RX ADMIN — APIXABAN SCH MG: 5 TABLET, FILM COATED ORAL at 21:32

## 2022-04-18 RX ADMIN — Medication SCH: at 23:47

## 2022-04-18 RX ADMIN — FOLIC ACID SCH MG: 1 TABLET ORAL at 17:12

## 2022-04-18 RX ADMIN — DICLOFENAC SODIUM SCH: 10 GEL TOPICAL at 13:09

## 2022-04-18 RX ADMIN — PIPERACILLIN AND TAZOBACTAM SCH MLS/HR: 3; .375 INJECTION, POWDER, FOR SOLUTION INTRAVENOUS at 17:11

## 2022-04-18 RX ADMIN — Medication SCH GM: at 18:17

## 2022-04-18 RX ADMIN — DICLOFENAC SODIUM SCH: 10 GEL TOPICAL at 18:17

## 2022-04-18 RX ADMIN — FAMOTIDINE SCH MG: 20 TABLET, FILM COATED ORAL at 08:33

## 2022-04-18 RX ADMIN — METRONIDAZOLE SCH MLS/HR: 500 INJECTION, SOLUTION INTRAVENOUS at 02:39

## 2022-04-18 RX ADMIN — MEMANTINE HYDROCHLORIDE SCH MG: 10 TABLET ORAL at 17:12

## 2022-04-18 RX ADMIN — PIPERACILLIN AND TAZOBACTAM SCH MLS/HR: 3; .375 INJECTION, POWDER, FOR SOLUTION INTRAVENOUS at 08:34

## 2022-04-18 RX ADMIN — DEXTROSE MONOHYDRATE AND SODIUM CHLORIDE SCH MLS/HR: 5; .9 INJECTION, SOLUTION INTRAVENOUS at 17:22

## 2022-04-18 RX ADMIN — MEMANTINE HYDROCHLORIDE SCH MG: 10 TABLET ORAL at 08:33

## 2022-04-18 RX ADMIN — METOPROLOL TARTRATE SCH MG: 50 TABLET, FILM COATED ORAL at 21:27

## 2022-04-18 RX ADMIN — DEXTROSE MONOHYDRATE AND SODIUM CHLORIDE SCH: 5; .9 INJECTION, SOLUTION INTRAVENOUS at 07:54

## 2022-04-18 RX ADMIN — ACETAMINOPHEN SCH MG: 325 TABLET, FILM COATED ORAL at 17:12

## 2022-04-18 RX ADMIN — ACETAMINOPHEN AND CODEINE PHOSPHATE PRN EACH: 300; 30 TABLET ORAL at 05:58

## 2022-04-18 RX ADMIN — DICLOFENAC SODIUM SCH: 10 GEL TOPICAL at 23:47

## 2022-04-18 RX ADMIN — METRONIDAZOLE SCH MLS/HR: 500 INJECTION, SOLUTION INTRAVENOUS at 15:59

## 2022-04-18 RX ADMIN — Medication SCH GM: at 13:08

## 2022-04-18 RX ADMIN — LOSARTAN POTASSIUM SCH MG: 50 TABLET, FILM COATED ORAL at 08:33

## 2022-04-18 RX ADMIN — APIXABAN SCH MG: 5 TABLET, FILM COATED ORAL at 08:33

## 2022-04-18 RX ADMIN — CYANOCOBALAMIN TAB 500 MCG SCH MCG: 500 TAB at 17:12

## 2022-04-18 RX ADMIN — METRONIDAZOLE SCH MLS/HR: 500 INJECTION, SOLUTION INTRAVENOUS at 08:01

## 2022-04-18 NOTE — P.PN
Progress Note - Text


Progress Note Date: 04/18/22





Chief Complaint: Abdominal pain





This is a 71-year-old patient, follows with Dr. Pandya at [facility].  

Chronic stable medical conditions include COPD, hypertension, hyperlipidemia, 

dementia.


Patient herself is not able to give much of a history except for abdominal pain.

 As per the EMS report they will call doubt patient had been complaining of 

abdominal pain.  No fever no chills no nausea vomiting was reported.  Patient 

really cannot tell much.  She is brought into the ER.  Patient denies any fever 

and chills.  Denies any urinary symptoms.  Cannot tell me about her bowel 

pattern.


April 14: ICU: Patient was taken to the OR yesterday by Dr. Vela and had 

surgery for strangulate and internal hernia with small bowel obstruction.  NG 

tube to suction in place.  On room air.  Has an abdominal dressing.  Esteves 

catheter.  Elevated shows sinus rhythm with PACs.  Pain control.  Given Catapres

patch for blood pressure.  Patient was noted to have bloody ascites.  Suspect 

underlying perforation.  Started IV Zosyn.


April 15: Nothing by mouth.  Laying in bed.  IV Zosyn.  IV fluids.  Low-grade 

fever.  Some abdominal pain.  NG tube.


April 16: Remains nothing by mouth.  Abdominal pain.  No BM.  IV Zosyn.  IV 

fluids.  Does not have NG tube.


April 17: Remains nothing by mouth.  Decrease abdominal pain.  No BM or flatus  

reported.  Has bowel sounds.


April 18: Seen by PT.  Max assist.  The was able to stand.  Remains nothing by 

mouth.  Atrial fibrillation rate controlled





Active Medications





Acetaminophen (Acetaminophen Tab 325 Mg Tab)  650 mg PO Q4H PRN


   PRN Reason: GENERAL DISCOMFORT


   Last Admin: 04/17/22 00:54 Dose:  650 mg


   Documented by: 


Acetaminophen (Acetaminophen Tab 325 Mg Tab)  650 mg PO BID@0800,[facility]


   Last Admin: 04/18/22 17:12 Dose:  650 mg


   Documented by: 


Acetaminophen/Codeine Phosphate (Acetaminophen-Codeine 300-30mg Tab)  1 each PO 

Q12H PRN


   PRN Reason: Pain


   Last Admin: 04/18/22 05:58 Dose:  1 each


   Documented by: 


Amlodipine Besylate (Amlodipine 5 Mg Tab)  5 mg PO BID [facility]


   Last Admin: 04/18/22 08:34 Dose:  5 mg


   Documented by: 


Apixaban (Apixaban 5 Mg Tab)  5 mg PO BID [facility]; Protocol


   Last Admin: 04/18/22 08:33 Dose:  5 mg


   Documented by: 


Cyanocobalamin (Cyanocobalamin 500 Mcg Tab)  500 mcg PO DAILY@1700 [facility]


   Last Admin: 04/18/22 17:12 Dose:  500 mcg


   Documented by: 


Diclofenac Sodium (Diclofenac Sodium Gel 100 Gm Tube)  2 gm TOPICAL QID [facility]; 

Protocol


   Last Admin: 04/18/22 18:17 Dose:  Not Given


   Documented by: 


Ergocalciferol (Ergocalciferol 1,250 Mcg (50,000 Iu) Capsule)  1,250 mcg PO 

SANTORO@1700 [facility]


   Last Admin: 04/17/22 15:23 Dose:  1,250 mcg


   Documented by: 


Famotidine (Famotidine 20 Mg Tab)  20 mg PO DAILY [facility]


   Last Admin: 04/18/22 08:33 Dose:  20 mg


   Documented by: 


Folic Acid (Folic Acid 1 Mg Tab)  1 mg PO DAILY@1700 [facility]


   Last Admin: 04/18/22 17:12 Dose:  1 mg


   Documented by: 


Glucose (Dextrose 4 Gm Chewable)  4 gm PO QID [facility]


   Last Admin: 04/18/22 18:17 Dose:  4 gm


   Documented by: 


Hydromorphone HCl (Hydromorphone 1 Mg/Ml 1 Ml Syringe)  1 mg IVP Q3HR PRN


   PRN Reason: Severe Pain


   Last Admin: 04/13/22 23:21 Dose:  1 mg


   Documented by: 


Piperacillin Sod/Tazobactam (Sod 3.375 gm/ Sodium Chloride)  100 mls @ 25 mls/hr

IVPB Q8HR [facility]; Protocol


   Last Admin: 04/18/22 17:11 Dose:  25 mls/hr


   Documented by: 


Metronidazole 500 mg/ IV (Solution)  100 mls @ 100 mls/hr IVPB Q6H [facility]; Protocol


   Last Admin: 04/18/22 15:59 Dose:  100 mls/hr


   Documented by: 


Dextrose/Sodium Chloride (Dextrose 5%-Ns Iv Soln)  1,000 mls @ 100 mls/hr IV 

.Q10H [facility]


   Last Admin: 04/18/22 17:22 Dose:  100 mls/hr


   Documented by: 


Lorazepam (Lorazepam 0.5 Mg Tab)  0.5 mg PO Q6HR PRN


   PRN Reason: Anxiety


   Last Admin: 04/17/22 05:26 Dose:  0.5 mg


   Documented by: 


Losartan Potassium (Losartan 50 Mg Tab)  50 mg PO DAILY [facility]


   Last Admin: 04/18/22 08:33 Dose:  50 mg


   Documented by: 


Melatonin (Melatonin 3 Mg Tablet)  3 mg PO HS PRN


   PRN Reason: Insomnia


   Last Admin: 04/17/22 20:38 Dose:  3 mg


   Documented by: 


Memantine (Memantine 10 Mg Tab)  10 mg PO BID@0800,1700 [facility]


   Last Admin: 04/18/22 17:12 Dose:  10 mg


   Documented by: 


Metoprolol Tartrate (Metoprolol Tartrate 50 Mg Tab)  50 mg PO Q12H [facility]


Naloxone HCl (Naloxone 0.4 Mg/Ml 1 Ml Vial)  0.2 mg IV Q2M PRN


   PRN Reason: Opioid Reversal


Ondansetron HCl (Ondansetron 4 Mg/2 Ml Vial)  4 mg IVP Q8HR PRN


   PRN Reason: Nausea And Vomiting


Pravastatin Sodium (Pravastatin Sodium 20 Mg Tab)  20 mg PO HS@2100 [facility]


   Last Admin: 04/17/22 19:42 Dose:  20 mg


   Documented by: 

















Past medical history to include:


COPD, hypertension, hyperlipidemia, dementia





Social history:


Resident at [facility].  Ex-smoker.





Physical examination:


VITAL SIGNS: 97.9, 88, 17, 109/59, 99% room air


GENERAL: , reclining in bed awake,


EYES: Pupils equal.  Conjunctiva normal.


HEENT: External appearance of nose and ears normal, oral cavity dry. 


NECK: JVD not raised; masses not palpable.


HEART: Heart sounds irregular  no edema.  


LUNGS: Respiratory rate normal; decreased breath sounds.  


ABDOMEN: Soft, no tenderness, no guarding rigidity, liver spleen not palpable, 

no masses palpable.  Dressing over incision.  Bowel sounds present


PSYCH: able to answer simple questions.  


MUSCULOSKELETAL:No Clubbing/cyanosis;muscles-grossly intact.  Evidence of OA.








INVESTIGATIONS, reviewed in the clinical context:


April 17: White count 14.7 hemoglobin 8.1 platelets 299 potassium 3.9 creatinine

0.75 pro-calcitonin 0.71


April 16: White count 15.4 hemoglobin 7.5 potassium 3.7 creatinine 0.91


April 15: White count 15 hemoglobin 8.2


April 14: White count 16.3 hemoglobin 8.6 platelets 208 potassium 4.1 creatinine

1.03 lactic acid 1.4


White count 12.3 hemoglobin 13.3 platelets 226 sodium 136 potassium 4.4 

creatinine 0.8 blood glucose 179


Lactic acid 3.3 then 4.7 total bilirubin 1.2 AST 32 ALT 16 lipase 65


UA:: Leukoesterase negative


EKG tracing personally reviewed by me-atrial fibrillation rate 89


Acute abdominal series personally reviewed by me: Check stat x-ray no obvious 

infiltrate.  Dilated bowel appeared to be small


2-D echocardiogram: Mild concentric LVH.  EF 50-60%





Assessment and plan:





-Acute strangulated internal hernia with small bowel obstruction.


Status post surgery by Dr. Vela on April 13.  Remains nothing by mouth





-Bloody ascites notice during surgery.  Suspect perforation.  Possible secondary

peritonitis.


IV Zosyn.  IV Flagyl





-Acute postprocedure blood loss anemia expected from surgery: 


IV Ferrlecit 2 doses.  Follow H&H.





-Major cognitive impairment likely from Alzheimer's dementia





-Obesity BMI 39.5





-IV heparin monitoring: Discontinued


Follow PTT





-Primary osteoarthritis multiple joints bilaterally


Voltaren gel 2 g topical 4 times a day, Tylenol 3 when necessary





-Essential hypertension


Lopressor 25 mg twice a day.  Norvasc 5 mg twice a day





-Hyperlipidemia


Pravachol 20 mg daily at bedtime





-Persistent atrial fibrillation,  rate controlled


Lopressor 25 mg twice a day





Nothing by mouth.  IV fluids.  Oral medications.. IV Zosyn. / IV Flagyl.  Follow

with PT OT.

## 2022-04-18 NOTE — P.PN
Subjective


Progress Note Date: 04/18/22





CHIEF COMPLAINT: Small bowel obstruction





HISTORY OF PRESENT ILLNESS: Patient is postop day #5 status post exploratory 

laparotomy, small bowel resection and repair of incisional hernia for 

strangulated internal hernia with small bowel obstruction.  Patient is currently

on the regular medical floor.  She is lying in bed comfortably.  Denies any 

abdominal pain.  Denies any nausea or vomiting.  Denies any bowel activity.  

Low-grade temp of 99.1.  She remains nothing by mouth except for ice chips.  

Patient's Eliquis has been restarted for her A. fib.





Patient seen and examined with Dr. diaz





PHYSICAL EXAM: 


VITAL SIGNS: Reviewed.


GENERAL: Well-developed in no acute distress. 


HEENT:  No sclera icterus. Extraocular movements grossly intact.  Moist buccal 

mucosa. Head is atraumatic, normocephalic. 


ABDOMEN:  Soft.  Nondistended. Nontender.  Incision site with 4 fermin.  small 

amount of serous drainage on bandage


NEUROLOGIC: Patient is awake and alert with some confusion





ASSESSMENT: 


1.  Strangulated internal hernia with small bowel obstruction status post 

laparotomy, small bowel resection and repair of incisional hernia


2.  Atrial fibrillation





PLAN: 


-Encouraged patient to increase activity level


-Discussed with nursing staff to have patients at bedside chair


-Patient scheduled to work with PT OT today


-Incentive spirometer ordered


-Continue IV fluids


-Continue supportive care


-Continue pain medication as needed





Physician Assistant note has been reviewed by physician. Signing provider agrees

with the documented findings, assessment, and plan of care. 





Objective





- Vital Signs


Vital signs: 


                                   Vital Signs











Temp  99.2 F   04/18/22 08:00


 


Pulse  56 L  04/18/22 08:00


 


Resp  14   04/18/22 08:00


 


BP  155/76   04/18/22 08:00


 


Pulse Ox  96   04/18/22 08:00








                                 Intake & Output











 04/17/22 04/18/22 04/18/22





 18:59 06:59 18:59


 


Intake Total 1000  300


 


Output Total 550 300 


 


Balance 450 -300 300


 


Intake:   


 


  Intake, IV Titration 1000  300





  Amount   


 


    Dextrose 5%-0.9% NaCl 1, 800  100





    000 ml @ 100 mls/hr IV .   





    Q10H [facility] Rx#:[id number]   


 


    Piperacillin-Tazobactam 3 100  100





    .375 gm In Sodium   





    Chloride 0.9% 100 ml @ 25   





    mls/hr IVPB Q8HR [facility] Rx#   





    :[id number]   


 


    metroNIDAZOLE-NS  100  100





    mg In Saline 1 100ml.bag   





    @ 100 mls/hr IVPB Q6H [facility]   





    Rx#:[id number]   


 


Output:   


 


  Urine 550 300 


 


Other:   


 


  Voiding Method  Indwelling Catheter Indwelling Catheter














- Labs


CBC & Chem 7: 


                                 04/17/22 04:52





                                 04/17/22 04:52


Labs: 


                  Abnormal Lab Results - Last 24 Hours (Table)











  04/17/22 Range/Units





  21:06 


 


POC Glucose (mg/dL)  116 H  (75-99)  mg/dL

## 2022-04-19 LAB
BASOPHILS # BLD AUTO: 0 K/UL (ref 0–0.2)
BASOPHILS NFR BLD AUTO: 0 %
EOSINOPHIL # BLD AUTO: 0.6 K/UL (ref 0–0.7)
EOSINOPHIL NFR BLD AUTO: 4 %
ERYTHROCYTE [DISTWIDTH] IN BLOOD BY AUTOMATED COUNT: 2.73 M/UL (ref 3.8–5.4)
ERYTHROCYTE [DISTWIDTH] IN BLOOD: 14.6 % (ref 11.5–15.5)
HCT VFR BLD AUTO: 27.8 % (ref 34–46)
HGB BLD-MCNC: 8.4 GM/DL (ref 11.4–16)
LYMPHOCYTES # SPEC AUTO: 1.1 K/UL (ref 1–4.8)
LYMPHOCYTES NFR SPEC AUTO: 9 %
MCH RBC QN AUTO: 30.8 PG (ref 25–35)
MCHC RBC AUTO-ENTMCNC: 30.2 G/DL (ref 31–37)
MCV RBC AUTO: 101.8 FL (ref 80–100)
MONOCYTES # BLD AUTO: 0.7 K/UL (ref 0–1)
MONOCYTES NFR BLD AUTO: 5 %
NEUTROPHILS # BLD AUTO: 10 K/UL (ref 1.3–7.7)
NEUTROPHILS NFR BLD AUTO: 80 %
PLATELET # BLD AUTO: 346 K/UL (ref 150–450)
WBC # BLD AUTO: 12.5 K/UL (ref 3.8–10.6)

## 2022-04-19 RX ADMIN — Medication PRN MG: at 20:20

## 2022-04-19 RX ADMIN — DEXTROSE MONOHYDRATE AND SODIUM CHLORIDE SCH: 5; .9 INJECTION, SOLUTION INTRAVENOUS at 11:53

## 2022-04-19 RX ADMIN — PRAVASTATIN SODIUM SCH MG: 20 TABLET ORAL at 20:20

## 2022-04-19 RX ADMIN — PIPERACILLIN AND TAZOBACTAM SCH MLS/HR: 3; .375 INJECTION, POWDER, FOR SOLUTION INTRAVENOUS at 00:31

## 2022-04-19 RX ADMIN — DICLOFENAC SODIUM SCH: 10 GEL TOPICAL at 11:52

## 2022-04-19 RX ADMIN — PIPERACILLIN AND TAZOBACTAM SCH MLS/HR: 3; .375 INJECTION, POWDER, FOR SOLUTION INTRAVENOUS at 09:42

## 2022-04-19 RX ADMIN — PIPERACILLIN AND TAZOBACTAM SCH MLS/HR: 3; .375 INJECTION, POWDER, FOR SOLUTION INTRAVENOUS at 16:46

## 2022-04-19 RX ADMIN — METOPROLOL TARTRATE SCH: 50 TABLET, FILM COATED ORAL at 20:30

## 2022-04-19 RX ADMIN — FAMOTIDINE SCH MG: 20 TABLET, FILM COATED ORAL at 09:21

## 2022-04-19 RX ADMIN — APIXABAN SCH MG: 5 TABLET, FILM COATED ORAL at 20:20

## 2022-04-19 RX ADMIN — MEMANTINE HYDROCHLORIDE SCH MG: 10 TABLET ORAL at 16:46

## 2022-04-19 RX ADMIN — METRONIDAZOLE SCH MLS/HR: 500 INJECTION, SOLUTION INTRAVENOUS at 20:20

## 2022-04-19 RX ADMIN — LOSARTAN POTASSIUM SCH MG: 50 TABLET, FILM COATED ORAL at 09:21

## 2022-04-19 RX ADMIN — Medication SCH GM: at 16:45

## 2022-04-19 RX ADMIN — METOPROLOL TARTRATE SCH MG: 50 TABLET, FILM COATED ORAL at 09:21

## 2022-04-19 RX ADMIN — DEXTROSE MONOHYDRATE AND SODIUM CHLORIDE SCH MLS/HR: 5; .9 INJECTION, SOLUTION INTRAVENOUS at 23:21

## 2022-04-19 RX ADMIN — CYANOCOBALAMIN TAB 500 MCG SCH MCG: 500 TAB at 16:45

## 2022-04-19 RX ADMIN — METRONIDAZOLE SCH MLS/HR: 500 INJECTION, SOLUTION INTRAVENOUS at 09:21

## 2022-04-19 RX ADMIN — DEXTROSE MONOHYDRATE AND SODIUM CHLORIDE SCH: 5; .9 INJECTION, SOLUTION INTRAVENOUS at 04:02

## 2022-04-19 RX ADMIN — ACETAMINOPHEN SCH MG: 325 TABLET, FILM COATED ORAL at 09:20

## 2022-04-19 RX ADMIN — Medication SCH: at 11:52

## 2022-04-19 RX ADMIN — ACETAMINOPHEN SCH MG: 325 TABLET, FILM COATED ORAL at 16:46

## 2022-04-19 RX ADMIN — PIPERACILLIN AND TAZOBACTAM SCH MLS/HR: 3; .375 INJECTION, POWDER, FOR SOLUTION INTRAVENOUS at 23:21

## 2022-04-19 RX ADMIN — DICLOFENAC SODIUM SCH: 10 GEL TOPICAL at 09:44

## 2022-04-19 RX ADMIN — METRONIDAZOLE SCH MLS/HR: 500 INJECTION, SOLUTION INTRAVENOUS at 16:46

## 2022-04-19 RX ADMIN — FOLIC ACID SCH MG: 1 TABLET ORAL at 16:46

## 2022-04-19 RX ADMIN — METRONIDAZOLE SCH MLS/HR: 500 INJECTION, SOLUTION INTRAVENOUS at 03:59

## 2022-04-19 RX ADMIN — MEMANTINE HYDROCHLORIDE SCH MG: 10 TABLET ORAL at 09:21

## 2022-04-19 RX ADMIN — Medication SCH GM: at 09:21

## 2022-04-19 RX ADMIN — APIXABAN SCH MG: 5 TABLET, FILM COATED ORAL at 09:20

## 2022-04-19 RX ADMIN — Medication SCH GM: at 20:20

## 2022-04-19 NOTE — P.PN
Subjective


Progress Note Date: 04/19/22





CHIEF COMPLAINT: Small bowel obstruction





HISTORY OF PRESENT ILLNESS: Patient is postop day #6 status post exploratory 

laparotomy, small bowel resection and repair of incisional hernia for 

strangulated internal hernia with small bowel obstruction.  Patient is sitting 

up in bedside chair.  She reports that her pain is okay.  Denies any nausea or 

vomiting.  She did require to be straight cathed this morning.  And otherwise 

has had no further issues with urinary retention.  She's afebrile.  White count 

trending down from 14.7-12.5 hemoglobin 8.1 up to 8.4.  She did have a small 

bowel movement.





Patient seen and examined with Dr. diaz





PHYSICAL EXAM: 


VITAL SIGNS: Reviewed.


GENERAL: Well-developed in no acute distress. 


HEENT:  No sclera icterus. Extraocular movements grossly intact.  Moist buccal 

mucosa. Head is atraumatic, normocephalic. 


ABDOMEN:  Soft.  Nondistended. Nontender.  Incision site with 4 fermin.  Dressing

clean dry and intact


NEUROLOGIC: Patient is awake and alert with confusion





ASSESSMENT: 


1.  Strangulated internal hernia with small bowel obstruction status post 

laparotomy, small bowel resection and repair of incisional hernia


2.  Atrial fibrillation





PLAN: 


-Start clear liquid diet


-Consult to social work for possible ECF placement


-Encouraged patient to increase activity level


-Incentive spirometer ordered


-Continue IV fluids


-Continue supportive care


-Continue pain medication as needed


-DVT prophylaxis Eliquis





Physician Assistant note has been reviewed by physician. Signing provider agrees

with the documented findings, assessment, and plan of care. 





Objective





- Vital Signs


Vital signs: 


                                   Vital Signs











Temp  98.4 F   04/19/22 06:50


 


Pulse  94   04/19/22 06:50


 


Resp  16   04/19/22 06:50


 


BP  135/76   04/19/22 06:50


 


Pulse Ox  94 L  04/19/22 06:50








                                 Intake & Output











 04/18/22 04/19/22 04/19/22





 18:59 06:59 18:59


 


Intake Total 1250  


 


Output Total 1451 450 


 


Balance -201 -450 


 


Weight 120.8 kg  


 


Intake:   


 


  Intake, IV Titration 1200  





  Amount   


 


    Dextrose 5%-0.9% NaCl 1, 800  





    000 ml @ 100 mls/hr IV .   





    Q10H [facility] Rx[facility][id number]   


 


    Piperacillin-Tazobactam 3 200  





    .375 gm In Sodium   





    Chloride 0.9% 100 ml @ 25   





    mls/hr IVPB Q8HR [facility] Rx#   





    :[id number]   


 


    metroNIDAZOLE-NS  200  





    mg In Saline 1 100ml.bag   





    @ 100 mls/hr IVPB Q6H [facility]   





    Rx#:[id number]   


 


  Oral 50  


 


Output:   


 


  Urine 1080 450 


 


    Straight 350  


 


  Post Void Residual 371  


 


Other:   


 


  Voiding Method Indwelling Catheter  Indwelling Catheter


 


  # Voids 1  


 


  # Bowel Movements 1  














- Labs


CBC & Chem 7: 


                                 04/19/22 04:57





                                 04/17/22 04:52


Labs: 


                  Abnormal Lab Results - Last 24 Hours (Table)











  04/19/22 Range/Units





  04:57 


 


WBC  12.5 H  (3.8-10.6)  k/uL


 


RBC  2.73 L  (3.80-5.40)  m/uL


 


Hgb  8.4 L  (11.4-16.0)  gm/dL


 


Hct  27.8 L  (34.0-46.0)  %


 


MCV  101.8 H  (80.0-100.0)  fL


 


MCHC  30.2 L  (31.0-37.0)  g/dL


 


Neutrophils #  10.0 H  (1.3-7.7)  k/uL

## 2022-04-19 NOTE — P.PN
Progress Note - Text


Progress Note Date: 04/19/22





Chief Complaint: Abdominal pain





This is a 71-year-old patient, follows with Dr. Pandya at [facility].  

Chronic stable medical conditions include COPD, hypertension, hyperlipidemia, 

dementia.


Patient herself is not able to give much of a history except for abdominal pain.

 As per the EMS report they will call doubt patient had been complaining of 

abdominal pain.  No fever no chills no nausea vomiting was reported.  Patient 

really cannot tell much.  She is brought into the ER.  Patient denies any fever 

and chills.  Denies any urinary symptoms.  Cannot tell me about her bowel 

pattern.


April 14: ICU: Patient was taken to the OR yesterday by Dr. Vela and had 

surgery for strangulate and internal hernia with small bowel obstruction.  NG 

tube to suction in place.  On room air.  Has an abdominal dressing.  Esteves 

catheter.  Elevated shows sinus rhythm with PACs.  Pain control.  Given Catapres

patch for blood pressure.  Patient was noted to have bloody ascites.  Suspect 

underlying perforation.  Started IV Zosyn.


April 15: Nothing by mouth.  Laying in bed.  IV Zosyn.  IV fluids.  Low-grade 

fever.  Some abdominal pain.  NG tube.


April 16: Remains nothing by mouth.  Abdominal pain.  No BM.  IV Zosyn.  IV 

fluids.  Does not have NG tube.


April 17: Remains nothing by mouth.  Decrease abdominal pain.  No BM or flatus  

reported.  Has bowel sounds.


April 18: Seen by PT.  Max assist.  The was able to stand.  Remains nothing by 

mouth.  Atrial fibrillation rate controlled


April 19: Sitting up that encounter.  Started on clear liquids.  Had a small BM.

 Abdominal pain control.  Sinus rhythm





Active Medications





Acetaminophen (Acetaminophen Tab 325 Mg Tab)  650 mg PO Q4H PRN


   PRN Reason: GENERAL DISCOMFORT


   Last Admin: 04/17/22 00:54 Dose:  650 mg


   Documented by: 


Acetaminophen (Acetaminophen Tab 325 Mg Tab)  650 mg PO BID@0800,1700 [facility]


   Last Admin: 04/19/22 09:20 Dose:  650 mg


   Documented by: 


Acetaminophen/Codeine Phosphate (Acetaminophen-Codeine 300-30mg Tab)  1 each PO 

Q12H PRN


   PRN Reason: Pain


   Last Admin: 04/18/22 05:58 Dose:  1 each


   Documented by: 


Amlodipine Besylate (Amlodipine 5 Mg Tab)  5 mg PO BID [facility]


   Last Admin: 04/19/22 09:21 Dose:  5 mg


   Documented by: 


Apixaban (Apixaban 5 Mg Tab)  5 mg PO BID [facility]; Protocol


   Last Admin: 04/19/22 09:20 Dose:  5 mg


   Documented by: 


Cyanocobalamin (Cyanocobalamin 500 Mcg Tab)  500 mcg PO DAILY@1700 [facility]


   Last Admin: 04/18/22 17:12 Dose:  500 mcg


   Documented by: 


Diclofenac Sodium (Diclofenac Sodium Gel 100 Gm Tube)  2 gm TOPICAL QID PRN; 

Protocol


   PRN Reason: Pain


Ergocalciferol (Ergocalciferol 1,250 Mcg (50,000 Iu) Capsule)  1,250 mcg PO 

SANTORO@1700 [facility]


   Last Admin: 04/17/22 15:23 Dose:  1,250 mcg


   Documented by: 


Famotidine (Famotidine 20 Mg Tab)  20 mg PO DAILY [facility]


   Last Admin: 04/19/22 09:21 Dose:  20 mg


   Documented by: 


Folic Acid (Folic Acid 1 Mg Tab)  1 mg PO DAILY@1700 [facility]


   Last Admin: 04/18/22 17:12 Dose:  1 mg


   Documented by: 


Glucose (Dextrose 4 Gm Chewable)  4 gm PO QID [facility]


   Last Admin: 04/19/22 11:52 Dose:  Not Given


   Documented by: 


Piperacillin Sod/Tazobactam (Sod 3.375 gm/ Sodium Chloride)  100 mls @ 25 mls/hr

IVPB Q8HR [facility]; Protocol


   Last Admin: 04/19/22 09:42 Dose:  25 mls/hr


   Documented by: 


Metronidazole 500 mg/ IV (Solution)  100 mls @ 100 mls/hr IVPB Q6H [facility]; Protocol


   Last Admin: 04/19/22 09:21 Dose:  100 mls/hr


   Documented by: 


Dextrose/Sodium Chloride (Dextrose 5%-Ns Iv Soln)  1,000 mls @ 100 mls/hr IV 

.Q10H [facility]


   Last Admin: 04/19/22 11:53 Dose:  Not Given


   Documented by: 


Lorazepam (Lorazepam 0.5 Mg Tab)  0.5 mg PO Q6HR PRN


   PRN Reason: Anxiety


   Last Admin: 04/17/22 05:26 Dose:  0.5 mg


   Documented by: 


Losartan Potassium (Losartan 50 Mg Tab)  50 mg PO DAILY [facility]


   Last Admin: 04/19/22 09:21 Dose:  50 mg


   Documented by: 


Melatonin (Melatonin 3 Mg Tablet)  3 mg PO HS PRN


   PRN Reason: Insomnia


   Last Admin: 04/17/22 20:38 Dose:  3 mg


   Documented by: 


Memantine (Memantine 10 Mg Tab)  10 mg PO BID@0800,1700 [facility]


   Last Admin: 04/19/22 09:21 Dose:  10 mg


   Documented by: 


Metoprolol Tartrate (Metoprolol Tartrate 50 Mg Tab)  50 mg PO Q12H [facility]


   Last Admin: 04/19/22 09:21 Dose:  50 mg


   Documented by: 


Naloxone HCl (Naloxone 0.4 Mg/Ml 1 Ml Vial)  0.2 mg IV Q2M PRN


   PRN Reason: Opioid Reversal


Ondansetron HCl (Ondansetron 4 Mg/2 Ml Vial)  4 mg IVP Q8HR PRN


   PRN Reason: Nausea And Vomiting


Pravastatin Sodium (Pravastatin Sodium 20 Mg Tab)  20 mg PO HS@2100 [facility]


   Last Admin: 04/17/22 19:42 Dose:  20 mg


   Documented by: 




















Past medical history to include:


COPD, hypertension, hyperlipidemia, dementia





Social history:


Resident at [facility].  Ex-smoker.





Physical examination:


VITAL SIGNS: 38.4, 94, 16, 135-76, 94% room air


GENERAL: In a trochanter, awake, watching TV


EYES: Pupils equal.  Conjunctiva normal.


HEENT: External appearance of nose and ears normal, oral cavity dry. 


NECK: JVD not raised; masses not palpable.


HEART: First seconds are normal  mild edema.  


LUNGS: Respiratory rate normal; decreased breath sounds.  


ABDOMEN: Soft, no tenderness, no guarding rigidity, liver spleen not palpable, 

no masses palpable.  Dressing over incision.  Bowel sounds present


PSYCH: able to answer simple questions.  


MUSCULOSKELETAL:No Clubbing/cyanosis;muscles-grossly intact.  Evidence of OA.








INVESTIGATIONS, reviewed in the clinical context:


April 19: White count 12.5 hemoglobin 8.4


April 17: White count 14.7 hemoglobin 8.1 platelets 299 potassium 3.9 creatinine

0.75 pro-calcitonin 0.71


April 16: White count 15.4 hemoglobin 7.5 potassium 3.7 creatinine 0.91


April 15: White count 15 hemoglobin 8.2


April 14: White count 16.3 hemoglobin 8.6 platelets 208 potassium 4.1 creatinine

1.03 lactic acid 1.4


White count 12.3 hemoglobin 13.3 platelets 226 sodium 136 potassium 4.4 

creatinine 0.8 blood glucose 179


Lactic acid 3.3 then 4.7 total bilirubin 1.2 AST 32 ALT 16 lipase 65


UA:: Leukoesterase negative


EKG tracing personally reviewed by me-atrial fibrillation rate 89


Acute abdominal series personally reviewed by me: Check stat x-ray no obvious 

infiltrate.  Dilated bowel appeared to be small


2-D echocardiogram: Mild concentric LVH.  EF 50-60%





Assessment and plan:





-Acute strangulated internal hernia with small bowel obstruction.


Status post surgery by Dr. Vela on April 13.  Started on clear liquids





-Bloody ascites notice during surgery.  Suspect perforation.  Possible secondary

peritonitis.


IV Zosyn.  IV Flagyl





-Acute postprocedure blood loss anemia expected from surgery: 


IV Ferrlecit 2 doses.  Follow H&H.





-Major cognitive impairment likely from Alzheimer's dementia





-Obesity BMI 39.5





-IV heparin monitoring: Discontinued


Follow PTT





-Primary osteoarthritis multiple joints bilaterally


Voltaren gel 2 g topical 4 times a day, Tylenol 3 when necessary





-Essential hypertension


Lopressor 25 mg twice a day.  Norvasc 5 mg twice a day





-Hyperlipidemia


Pravachol 20 mg daily at bedtime





-Paroxysmal atrial fibrillation, currently in sinus rhythm


Lopressor 25 mg twice a day





Clear liquids.  IV fluids.  Oral medications.. IV Zosyn. / IV Flagyl.  Follow 

with surgery

## 2022-04-20 LAB
BASOPHILS # BLD AUTO: 0 K/UL (ref 0–0.2)
BASOPHILS NFR BLD AUTO: 0 %
EOSINOPHIL # BLD AUTO: 0.5 K/UL (ref 0–0.7)
EOSINOPHIL NFR BLD AUTO: 5 %
ERYTHROCYTE [DISTWIDTH] IN BLOOD BY AUTOMATED COUNT: 2.86 M/UL (ref 3.8–5.4)
ERYTHROCYTE [DISTWIDTH] IN BLOOD: 14.9 % (ref 11.5–15.5)
GLUCOSE BLD-MCNC: 102 MG/DL (ref 75–99)
HCT VFR BLD AUTO: 28.8 % (ref 34–46)
HGB BLD-MCNC: 8.5 GM/DL (ref 11.4–16)
LYMPHOCYTES # SPEC AUTO: 1.4 K/UL (ref 1–4.8)
LYMPHOCYTES NFR SPEC AUTO: 12 %
MCH RBC QN AUTO: 29.8 PG (ref 25–35)
MCHC RBC AUTO-ENTMCNC: 29.6 G/DL (ref 31–37)
MCV RBC AUTO: 100.9 FL (ref 80–100)
MONOCYTES # BLD AUTO: 0.5 K/UL (ref 0–1)
MONOCYTES NFR BLD AUTO: 5 %
NEUTROPHILS # BLD AUTO: 8.8 K/UL (ref 1.3–7.7)
NEUTROPHILS NFR BLD AUTO: 77 %
PLATELET # BLD AUTO: 421 K/UL (ref 150–450)
WBC # BLD AUTO: 11.4 K/UL (ref 3.8–10.6)

## 2022-04-20 RX ADMIN — FAMOTIDINE SCH MG: 20 TABLET, FILM COATED ORAL at 09:30

## 2022-04-20 RX ADMIN — PIPERACILLIN AND TAZOBACTAM SCH MLS/HR: 3; .375 INJECTION, POWDER, FOR SOLUTION INTRAVENOUS at 16:12

## 2022-04-20 RX ADMIN — METOPROLOL TARTRATE SCH MG: 50 TABLET, FILM COATED ORAL at 19:17

## 2022-04-20 RX ADMIN — METOPROLOL TARTRATE SCH MG: 50 TABLET, FILM COATED ORAL at 09:30

## 2022-04-20 RX ADMIN — PIPERACILLIN AND TAZOBACTAM SCH MLS/HR: 3; .375 INJECTION, POWDER, FOR SOLUTION INTRAVENOUS at 09:32

## 2022-04-20 RX ADMIN — LOSARTAN POTASSIUM SCH MG: 50 TABLET, FILM COATED ORAL at 09:30

## 2022-04-20 RX ADMIN — APIXABAN SCH MG: 5 TABLET, FILM COATED ORAL at 09:30

## 2022-04-20 RX ADMIN — PRAVASTATIN SODIUM SCH MG: 20 TABLET ORAL at 19:17

## 2022-04-20 RX ADMIN — ACETAMINOPHEN SCH MG: 325 TABLET, FILM COATED ORAL at 16:12

## 2022-04-20 RX ADMIN — Medication SCH: at 11:25

## 2022-04-20 RX ADMIN — CYANOCOBALAMIN TAB 500 MCG SCH MCG: 500 TAB at 16:13

## 2022-04-20 RX ADMIN — MEMANTINE HYDROCHLORIDE SCH MG: 10 TABLET ORAL at 16:12

## 2022-04-20 RX ADMIN — Medication SCH: at 16:13

## 2022-04-20 RX ADMIN — ACETAMINOPHEN SCH MG: 325 TABLET, FILM COATED ORAL at 09:28

## 2022-04-20 RX ADMIN — APIXABAN SCH MG: 5 TABLET, FILM COATED ORAL at 19:17

## 2022-04-20 RX ADMIN — FOLIC ACID SCH MG: 1 TABLET ORAL at 16:12

## 2022-04-20 RX ADMIN — Medication SCH GM: at 09:31

## 2022-04-20 RX ADMIN — DEXTROSE MONOHYDRATE AND SODIUM CHLORIDE SCH MLS/HR: 5; .9 INJECTION, SOLUTION INTRAVENOUS at 09:56

## 2022-04-20 RX ADMIN — MEMANTINE HYDROCHLORIDE SCH MG: 10 TABLET ORAL at 09:30

## 2022-04-20 RX ADMIN — METRONIDAZOLE SCH MLS/HR: 500 INJECTION, SOLUTION INTRAVENOUS at 02:26

## 2022-04-20 RX ADMIN — DEXTROSE MONOHYDRATE AND SODIUM CHLORIDE SCH: 5; .9 INJECTION, SOLUTION INTRAVENOUS at 19:20

## 2022-04-20 RX ADMIN — METRONIDAZOLE SCH MLS/HR: 500 INJECTION, SOLUTION INTRAVENOUS at 09:32

## 2022-04-20 NOTE — P.PN
Subjective





This is a 71-year-old patient, follows with Dr. Pandya at [facility].  

Chronic stable medical conditions include COPD, hypertension, hyperlipidemia, 

dementia.


Patient herself is not able to give much of a history except for abdominal pain.

 As per the EMS report they will call doubt patient had been complaining of 

abdominal pain.  No fever no chills no nausea vomiting was reported.  Patient 

really cannot tell much.  She is brought into the ER.  Patient denies any fever 

and chills.  Denies any urinary symptoms.  Cannot tell me about her bowel 

pattern.


April 14: ICU: Patient was taken to the OR yesterday by Dr. Vela and had 

surgery for strangulate and internal hernia with small bowel obstruction.  NG 

tube to suction in place.  On room air.  Has an abdominal dressing.  Esteves 

catheter.  Elevated shows sinus rhythm with PACs.  Pain control.  Given Catapres

patch for blood pressure.  Patient was noted to have bloody ascites.  Suspect 

underlying perforation.  Started IV Zosyn.


April 15: Nothing by mouth.  Laying in bed.  IV Zosyn.  IV fluids.  Low-grade 

fever.  Some abdominal pain.  NG tube.


April 16: Remains nothing by mouth.  Abdominal pain.  No BM.  IV Zosyn.  IV 

fluids.  Does not have NG tube.


April 17: Remains nothing by mouth.  Decrease abdominal pain.  No BM or flatus  

reported.  Has bowel sounds.


April 18: Seen by PT.  Max assist.  The was able to stand.  Remains nothing by 

mouth.  Atrial fibrillation rate controlled


April 19: Sitting up that encounter.  Started on clear liquids.  Had a small BM.

 Abdominal pain control.  Sinus rhythm





Subjective: I am resuming the care of the patient today 04/20/2022 this is a 

pleasant 71 years old -American female with multiple medical problems 

presents with bowel obstruction requiring hernia repair for her sacculated to 

internal hernia and small bowel resection when exploratory laparotomy done on 

4/13 it biopsy report reviewed showing ischemic enteritis with mucosal necrosis.

 Resection margins are unremarkable.


Vitals stable.  WBC 11.4, hemoglobin stable 8.5.


She still on liquids 5 mg, D5 normal saline lower to 75 mL/h and also she is on 

IV Flagyl and IV Zosyn.  We will discontinue Flagyl as Zosyn can cover unaerobes

and to decrease the side effects


Today she is lying in bed, angina her clear liquid breakfast.  She feels hungry 

and ask for ice cream.  She denies abdominal pain.


She has bilateral Fatima leg edema.  We lowered normal saline at 75 mL per hour 

and give 1 dose of IV Lasix








Objective





- Vital Signs


Vital signs: 


                                   Vital Signs











Temp  98.3 F   04/20/22 06:41


 


Pulse  84   04/20/22 06:41


 


Resp  16   04/20/22 06:41


 


BP  154/82   04/20/22 06:41


 


Pulse Ox  97   04/20/22 06:41








                                 Intake & Output











 04/19/22 04/20/22 04/20/22





 18:59 06:59 18:59


 


Output Total 510 1400 


 


Balance -510 -1400 


 


Output:   


 


  Urine 510 1400 


 


Other:   


 


  Voiding Method Indwelling Catheter  














- Exam





- GENERAL: The patient is alert and oriented x3, not in any acute distress.  

Morbidly obese


HEENT: Pupils are round and equally reacting to light. EOMI. No scleral icterus.

No conjunctival pallor. Normocephalic, atraumatic. No pharyngeal erythema. No 

thyromegaly. 


CARDIOVASCULAR: S1 and S2 present. No murmurs, rubs, or gallops. 


PULMONARY: Chest is clear to auscultation, no wheezing or crackles. 


ABDOMEN: Soft, nontender, nondistended, normoactive bowel sounds. No palpable 

organomegaly. 


MUSCULOSKELETAL: No joint swelling or deformity. 


-EXTREMITIES: No cyanosis, clubbing,.  1+ bilateral pitting leg edema. 


NEUROLOGICAL: Gross neurological examination did not reveal any focal deficits. 


SKIN: No rashes. no petechiae.





- Labs


CBC & Chem 7: 


                                 04/20/22 09:01





                                 04/17/22 04:52


Labs: 


                  Abnormal Lab Results - Last 24 Hours (Table)











  04/20/22 04/20/22 Range/Units





  07:07 09:01 


 


WBC   11.4 H  (3.8-10.6)  k/uL


 


RBC   2.86 L  (3.80-5.40)  m/uL


 


Hgb   8.5 L  (11.4-16.0)  gm/dL


 


Hct   28.8 L  (34.0-46.0)  %


 


MCV   100.9 H  (80.0-100.0)  fL


 


MCHC   29.6 L  (31.0-37.0)  g/dL


 


Neutrophils #   8.8 H  (1.3-7.7)  k/uL


 


POC Glucose (mg/dL)  102 H   (75-99)  mg/dL














Assessment and Plan


Assessment: 





-Acute strangulated internal hernia with small bowel obstruction.


Status post surgery by Dr. Vela on April 13.  Started on clear liquids





-Suspected intra-abdominal infection


Continue with IV Zosyn.  IV Flagyl was discontinued





- Ischemic bowel, status post surgical resection


Improvement





-Acute postprocedure blood loss anemia expected from surgery: 


IV Ferrlecit 2 doses.  Follow H&H.





-Major cognitive impairment likely from Alzheimer's dementia





-Obesity BMI 39.5





-IV heparin monitoring: Discontinued


Follow PTT





-Primary osteoarthritis multiple joints bilaterally


Voltaren gel 2 g topical 4 times a day, Tylenol 3 when necessary





-Essential hypertension


Lopressor 25 mg twice a day.  Norvasc 5 mg twice a day





-Hyperlipidemia


Pravachol 20 mg daily at bedtime





-Paroxysmal atrial fibrillation, currently in sinus rhythm


Lopressor 25 mg twice a day

## 2022-04-20 NOTE — P.PN
Subjective


Progress Note Date: 04/20/22





CHIEF COMPLAINT: Small bowel obstruction





HISTORY OF PRESENT ILLNESS: Patient is postop day #7 status post exploratory 

laparotomy, small bowel resection and repair of incisional hernia for 

strangulated internal hernia with small bowel obstruction.  Patient is lying in 

bed.  She denies any abdominal pain.  She did have a small bowel movement 

yesterday.  She denies any nausea or vomiting.  She did drink a little bit of 

her clear liquids.  Patient did receive a dose of IV Lasix today.  Afebrile WBC 

is down from 12.5-11.4 hemoglobin is 8.5








PHYSICAL EXAM: 


VITAL SIGNS: Reviewed.


GENERAL: Well-developed in no acute distress. 


HEENT:  No sclera icterus. Extraocular movements grossly intact.  Moist buccal 

mucosa. Head is atraumatic, normocephalic. 


ABDOMEN:  Soft.  Nondistended. Nontender.  Incision site with 4 fermin.  Small 

area of dehiscence at the upper part of the incision.  Incision site clean and 

dry


NEUROLOGIC: Patient is awake and alert with confusion





ASSESSMENT: 


1.  Strangulated internal hernia with small bowel obstruction status post 

laparotomy, small bowel resection and repair of incisional hernia


2.  Atrial fibrillation





PLAN: 


-Continue clear liquid diet


-Patient to return to [facility] after discharge


-Encouraged patient to increase activity level


-Incentive spirometer ordered


-Continue supportive care


-Continue pain medication as needed


-DVT prophylaxis Eliquis





Physician Assistant note has been reviewed by physician. Signing provider agrees

with the documented findings, assessment, and plan of care. 





Objective





- Vital Signs


Vital signs: 


                                   Vital Signs











Temp  98.9 F   04/20/22 14:12


 


Pulse  67   04/20/22 14:12


 


Resp  17   04/20/22 14:12


 


BP  111/68   04/20/22 14:12


 


Pulse Ox  94 L  04/20/22 14:12








                                 Intake & Output











 04/19/22 04/20/22 04/20/22





 18:59 06:59 18:59


 


Output Total 510 1400 


 


Balance -510 -1400 


 


Output:   


 


  Urine 510 1400 


 


Other:   


 


  Voiding Method Indwelling Catheter  














- Labs


CBC & Chem 7: 


                                 04/20/22 09:01





                                 04/17/22 04:52


Labs: 


                  Abnormal Lab Results - Last 24 Hours (Table)











  04/20/22 04/20/22 Range/Units





  07:07 09:01 


 


WBC   11.4 H  (3.8-10.6)  k/uL


 


RBC   2.86 L  (3.80-5.40)  m/uL


 


Hgb   8.5 L  (11.4-16.0)  gm/dL


 


Hct   28.8 L  (34.0-46.0)  %


 


MCV   100.9 H  (80.0-100.0)  fL


 


MCHC   29.6 L  (31.0-37.0)  g/dL


 


Neutrophils #   8.8 H  (1.3-7.7)  k/uL


 


POC Glucose (mg/dL)  102 H   (75-99)  mg/dL

## 2022-04-21 LAB
ANION GAP SERPL CALC-SCNC: 10.8 MMOL/L (ref 10–18)
BASOPHILS # BLD AUTO: 0.03 X 10*3/UL (ref 0–0.1)
BASOPHILS NFR BLD AUTO: 0.3 %
BUN SERPL-SCNC: 3.9 MG/DL (ref 9–27)
BUN/CREAT SERPL: 6.5 RATIO (ref 12–20)
CALCIUM SPEC-MCNC: 8 MG/DL (ref 8.7–10.3)
CHLORIDE SERPL-SCNC: 108 MMOL/L (ref 96–109)
CO2 SERPL-SCNC: 24.2 MMOL/L (ref 20–27.5)
EOSINOPHIL # BLD AUTO: 0.54 X 10*3/UL (ref 0.04–0.35)
EOSINOPHIL NFR BLD AUTO: 5.5 %
ERYTHROCYTE [DISTWIDTH] IN BLOOD BY AUTOMATED COUNT: 2.58 X 10*6/UL (ref 4.1–5.2)
ERYTHROCYTE [DISTWIDTH] IN BLOOD: 15.5 % (ref 11.5–14.5)
GLUCOSE SERPL-MCNC: 106 MG/DL (ref 70–110)
HCT VFR BLD AUTO: 25.4 % (ref 37.2–46.3)
HGB BLD-MCNC: 7.8 G/DL (ref 12–15)
IMM GRANULOCYTES BLD QL AUTO: 1.7 %
LYMPHOCYTES # SPEC AUTO: 1.42 X 10*3/UL (ref 0.9–5)
LYMPHOCYTES NFR SPEC AUTO: 14.5 %
MAGNESIUM SPEC-SCNC: 1.4 MG/DL (ref 1.5–2.4)
MCH RBC QN AUTO: 30.2 PG (ref 27–32)
MCHC RBC AUTO-ENTMCNC: 30.7 G/DL (ref 32–37)
MCV RBC AUTO: 98.4 FL (ref 80–97)
MONOCYTES # BLD AUTO: 0.76 X 10*3/UL (ref 0.2–1)
MONOCYTES NFR BLD AUTO: 7.8 %
NEUTROPHILS # BLD AUTO: 6.88 X 10*3/UL (ref 1.8–7.7)
NEUTROPHILS NFR BLD AUTO: 70.2 %
NRBC BLD AUTO-RTO: 0 /100 WBCS (ref 0–0)
PLATELET # BLD AUTO: 439 X 10*3/UL (ref 140–440)
POTASSIUM SERPL-SCNC: 3.1 MMOL/L (ref 3.5–5.5)
SODIUM SERPL-SCNC: 143 MMOL/L (ref 135–145)
WBC # BLD AUTO: 9.8 X 10*3/UL (ref 4.5–10)

## 2022-04-21 RX ADMIN — METOPROLOL TARTRATE SCH MG: 50 TABLET, FILM COATED ORAL at 19:17

## 2022-04-21 RX ADMIN — Medication SCH GM: at 13:01

## 2022-04-21 RX ADMIN — Medication SCH GM: at 07:36

## 2022-04-21 RX ADMIN — PIPERACILLIN AND TAZOBACTAM SCH MLS/HR: 3; .375 INJECTION, POWDER, FOR SOLUTION INTRAVENOUS at 16:11

## 2022-04-21 RX ADMIN — MEMANTINE HYDROCHLORIDE SCH MG: 10 TABLET ORAL at 07:36

## 2022-04-21 RX ADMIN — MAGNESIUM SULFATE IN DEXTROSE SCH MLS/HR: 10 INJECTION, SOLUTION INTRAVENOUS at 14:07

## 2022-04-21 RX ADMIN — Medication SCH GM: at 17:12

## 2022-04-21 RX ADMIN — Medication SCH MG: at 13:01

## 2022-04-21 RX ADMIN — Medication SCH: at 00:55

## 2022-04-21 RX ADMIN — APIXABAN SCH MG: 5 TABLET, FILM COATED ORAL at 19:17

## 2022-04-21 RX ADMIN — POTASSIUM CHLORIDE SCH MEQ: 20 TABLET, EXTENDED RELEASE ORAL at 14:07

## 2022-04-21 RX ADMIN — POTASSIUM CHLORIDE SCH MEQ: 20 TABLET, EXTENDED RELEASE ORAL at 14:09

## 2022-04-21 RX ADMIN — MEMANTINE HYDROCHLORIDE SCH MG: 10 TABLET ORAL at 17:12

## 2022-04-21 RX ADMIN — LOSARTAN POTASSIUM SCH MG: 50 TABLET, FILM COATED ORAL at 07:36

## 2022-04-21 RX ADMIN — PRAVASTATIN SODIUM SCH MG: 20 TABLET ORAL at 19:17

## 2022-04-21 RX ADMIN — PIPERACILLIN AND TAZOBACTAM SCH MLS/HR: 3; .375 INJECTION, POWDER, FOR SOLUTION INTRAVENOUS at 07:36

## 2022-04-21 RX ADMIN — METOPROLOL TARTRATE SCH MG: 50 TABLET, FILM COATED ORAL at 07:36

## 2022-04-21 RX ADMIN — Medication SCH GM: at 23:26

## 2022-04-21 RX ADMIN — Medication SCH MG: at 17:12

## 2022-04-21 RX ADMIN — APIXABAN SCH MG: 5 TABLET, FILM COATED ORAL at 07:37

## 2022-04-21 RX ADMIN — ACETAMINOPHEN SCH MG: 325 TABLET, FILM COATED ORAL at 17:12

## 2022-04-21 RX ADMIN — ACETAMINOPHEN SCH MG: 325 TABLET, FILM COATED ORAL at 07:37

## 2022-04-21 RX ADMIN — DEXTROSE MONOHYDRATE AND SODIUM CHLORIDE SCH MLS/HR: 5; .9 INJECTION, SOLUTION INTRAVENOUS at 00:56

## 2022-04-21 RX ADMIN — MAGNESIUM SULFATE IN DEXTROSE SCH MLS/HR: 10 INJECTION, SOLUTION INTRAVENOUS at 15:04

## 2022-04-21 RX ADMIN — PIPERACILLIN AND TAZOBACTAM SCH MLS/HR: 3; .375 INJECTION, POWDER, FOR SOLUTION INTRAVENOUS at 00:56

## 2022-04-21 RX ADMIN — PIPERACILLIN AND TAZOBACTAM SCH MLS/HR: 3; .375 INJECTION, POWDER, FOR SOLUTION INTRAVENOUS at 23:26

## 2022-04-21 RX ADMIN — MAGNESIUM SULFATE IN DEXTROSE SCH MLS/HR: 10 INJECTION, SOLUTION INTRAVENOUS at 21:54

## 2022-04-21 RX ADMIN — FOLIC ACID SCH MG: 1 TABLET ORAL at 17:12

## 2022-04-21 RX ADMIN — FAMOTIDINE SCH MG: 20 TABLET, FILM COATED ORAL at 07:36

## 2022-04-21 RX ADMIN — CYANOCOBALAMIN TAB 500 MCG SCH MCG: 500 TAB at 17:12

## 2022-04-21 NOTE — P.PN
Subjective


Progress Note Date: 04/21/22





CHIEF COMPLAINT: Small bowel obstruction





HISTORY OF PRESENT ILLNESS: Patient is postop day #8 status post exploratory 

laparotomy, small bowel resection and repair of incisional hernia for 

strangulated internal hernia with small bowel obstruction.  Patient is lying in 

bed.  Per nursing patient did have a bowel movement yesterday.  Patient denies 

any abdominal pain.  Denies any nausea vomiting.  She is tolerating her clear 

liquids.  Afebrile.  WBC has normalized from 11.4-9.8 hemoglobin did drop to 7.8

platelets 439 sodium 143 potassium is 3.1 creatinine 0.6 magnesium 1.4





Patient seen and examined with Dr. diaz





PHYSICAL EXAM: 


VITAL SIGNS: Reviewed.


GENERAL: Well-developed in no acute distress. 


HEENT:  No sclera icterus. Extraocular movements grossly intact.  Moist buccal 

mucosa. Head is atraumatic, normocephalic. 


ABDOMEN:  Soft.  Nondistended. Nontender.  Incision site with 4 fermin.  Small 

area of dehiscence at the upper part of the incision.  Incision site clean and 

dry


NEUROLOGIC: Patient is awake and alert with confusion





ASSESSMENT: 


1.  Strangulated internal hernia with small bowel obstruction status post 

laparotomy, small bowel resection and repair of incisional hernia


2.  Atrial fibrillation


3.  Hypomagnesemia and hypokalemia





PLAN: 


-Advance to full liquid diet


Hep-Lock IV-


-Magnesium and potassium are being replaced


-Patient to return to [facility] after discharge


-Anticipate possible discharge tomorrow


-Encouraged patient to increase activity level


-Incentive spirometer ordered


-Continue supportive care


-Continue pain medication as needed


-DVT prophylaxis Eliquis





Physician Assistant note has been reviewed by physician. Signing provider agrees

with the documented findings, assessment, and plan of care. 





Objective





- Vital Signs


Vital signs: 


                                   Vital Signs











Temp  97.8 F   04/21/22 13:58


 


Pulse  68   04/21/22 13:58


 


Resp  18   04/21/22 13:58


 


BP  120/74   04/21/22 13:58


 


Pulse Ox  100   04/21/22 13:58








                                 Intake & Output











 04/20/22 04/21/22 04/21/22





 18:59 06:59 18:59


 


Output Total 1100  1200


 


Balance -1100  -1200


 


Weight   120.8 kg


 


Output:   


 


  Urine 1100  1200


 


Other:   


 


  Voiding Method   External Catheter


 


  # Voids 1 3 


 


  # Bowel Movements 1  














- Labs


CBC & Chem 7: 


                                 04/21/22 05:44





                                 04/21/22 05:44


Labs: 


                  Abnormal Lab Results - Last 24 Hours (Table)











  04/21/22 04/21/22 04/21/22 Range/Units





  05:44 05:44 05:44 


 


RBC   2.58 L   (4.10-5.20)  X 10*6/uL


 


Hgb   7.8 L   (12.0-15.0)  g/dL


 


Hct   25.4 L   (37.2-46.3)  %


 


MCV   98.4 H   (80.0-97.0)  fL


 


MCHC   30.7 L   (32.0-37.0)  g/dL


 


RDW   15.5 H   (11.5-14.5)  %


 


Immature Gran #   0.17 H   (0.00-0.04)  X 10*3/uL


 


Eosinophils #   0.54 H   (0.04-0.35)  X 10*3/uL


 


Potassium    3.1 L  (3.5-5.5)  mmol/L


 


BUN    3.9 L  (9.0-27.0)  mg/dL


 


BUN/Creatinine Ratio    6.50 L  (12.00-20.00)  Ratio


 


Calcium    8.0 L  (8.7-10.3)  mg/dL


 


Magnesium    1.4 L  (1.5-2.4)  mg/dL


 


Procalcitonin  15.80 H    (0.02-0.09)  ng/mL

## 2022-04-21 NOTE — P.PN
Subjective





This is a 71-year-old patient, follows with Dr. Pandya at [facility].  

Chronic stable medical conditions include COPD, hypertension, hyperlipidemia, 

dementia.


Patient herself is not able to give much of a history except for abdominal pain.

 As per the EMS report they will call doubt patient had been complaining of 

abdominal pain.  No fever no chills no nausea vomiting was reported.  Patient 

really cannot tell much.  She is brought into the ER.  Patient denies any fever 

and chills.  Denies any urinary symptoms.  Cannot tell me about her bowel 

pattern.


April 14: ICU: Patient was taken to the OR yesterday by Dr. Vela and had 

surgery for strangulate and internal hernia with small bowel obstruction.  NG 

tube to suction in place.  On room air.  Has an abdominal dressing.  Esteves 

catheter.  Elevated shows sinus rhythm with PACs.  Pain control.  Given Catapres

patch for blood pressure.  Patient was noted to have bloody ascites.  Suspect 

underlying perforation.  Started IV Zosyn.


April 15: Nothing by mouth.  Laying in bed.  IV Zosyn.  IV fluids.  Low-grade 

fever.  Some abdominal pain.  NG tube.


April 16: Remains nothing by mouth.  Abdominal pain.  No BM.  IV Zosyn.  IV 

fluids.  Does not have NG tube.


April 17: Remains nothing by mouth.  Decrease abdominal pain.  No BM or flatus  

reported.  Has bowel sounds.


April 18: Seen by PT.  Max assist.  The was able to stand.  Remains nothing by 

mouth.  Atrial fibrillation rate controlled


April 19: Sitting up that encounter.  Started on clear liquids.  Had a small BM.

 Abdominal pain control.  Sinus rhythm





Subjective: I am resuming the care of the patient today 04/20/2022 this is a 

pleasant 71 years old -American female with multiple medical problems 

presents with bowel obstruction requiring hernia repair for her sacculated to 

internal hernia and small bowel resection when exploratory laparotomy done on 

4/13 it biopsy report reviewed showing ischemic enteritis with mucosal necrosis.

 Resection margins are unremarkable.


Vitals stable.  WBC 11.4, hemoglobin stable 8.5.


She still on liquids 5 mg, D5 normal saline lower to 75 mL/h and also she is on 

IV Flagyl and IV Zosyn.  We will discontinue Flagyl as Zosyn can cover unaerobes

and to decrease the side effects


Today she is lying in bed, angina her clear liquid breakfast.  She feels hungry 

and ask for ice cream.  She denies abdominal pain.


She has bilateral Fatima leg edema.  We lowered normal saline at 75 mL per hour 

and give 1 dose of IV Lasix








04/21/2022


Patient awake and alert and tolerates diet.  She was eating 5200% of her meals 

however she refused to take food this morning therefore we will keep monitoring.


Also patient with forgetfulness.  She denies abdominal pain in her abdomen looks

soft.


Leukocytosis improved and hemoglobin on the low side most likely secondary to 

surgical blood loss.  We started her on ferrous sulfate.


Also we'll change her Pepcid to Protonix while she is on a liquids.


She's continued on Zosyn however her pro-calcitonin significantly worsened up 

to 15.8 therefore we will consult infectious disease team to guide in her 

antibiotic management.


Her D5 normal saline was discontinued.





Objective





- Vital Signs


Vital signs: 


                                   Vital Signs











Temp  98.8 F   04/21/22 07:40


 


Pulse  77   04/21/22 07:40


 


Resp  18   04/21/22 07:40


 


BP  122/64   04/21/22 07:40


 


Pulse Ox  95   04/21/22 07:40








                                 Intake & Output











 04/20/22 04/21/22 04/21/22





 18:59 06:59 18:59


 


Output Total 1100  


 


Balance -1100  


 


Output:   


 


  Urine 1100  


 


Other:   


 


  Voiding Method   External Catheter


 


  # Voids 1 3 


 


  # Bowel Movements 1  














- Exam





- GENERAL: The patient is alert and oriented x3, not in any acute distress.  

Morbidly obese


HEENT: Pupils are round and equally reacting to light. EOMI. No scleral icterus.

No conjunctival pallor. Normocephalic, atraumatic. No pharyngeal erythema. No 

thyromegaly. 


CARDIOVASCULAR: S1 and S2 present. No murmurs, rubs, or gallops. 


PULMONARY: Chest is clear to auscultation, no wheezing or crackles. 


ABDOMEN: Soft, nontender, nondistended, normoactive bowel sounds. No palpable 

organomegaly. 


MUSCULOSKELETAL: No joint swelling or deformity. 


-EXTREMITIES: No cyanosis, clubbing,.  1+ bilateral pitting leg edema. 


NEUROLOGICAL: Gross neurological examination did not reveal any focal deficits. 


SKIN: No rashes. no petechiae.





- Labs


CBC & Chem 7: 


                                 04/21/22 05:44





                                 04/21/22 05:44


Labs: 


                  Abnormal Lab Results - Last 24 Hours (Table)











  04/21/22 Range/Units





  05:44 


 


RBC  2.58 L  (4.10-5.20)  X 10*6/uL


 


Hgb  7.8 L  (12.0-15.0)  g/dL


 


Hct  25.4 L  (37.2-46.3)  %


 


MCV  98.4 H  (80.0-97.0)  fL


 


MCHC  30.7 L  (32.0-37.0)  g/dL


 


RDW  15.5 H  (11.5-14.5)  %


 


Immature Gran #  0.17 H  (0.00-0.04)  X 10*3/uL


 


Eosinophils #  0.54 H  (0.04-0.35)  X 10*3/uL














Assessment and Plan


Assessment: 





-Acute strangulated internal hernia with small bowel obstruction.


Status post surgery by Dr. Vela on April 13.  Started on clear liquids.  

Advance diet as per surgery team





-Suspected intra-abdominal infection


Continue with IV Zosyn.  IV Flagyl was discontinued.


Consult infectious disease team





-Paroxysmal atrial fibrillation, currently in sinus rhythm


Lopressor 25 mg twice a day.  Continue with Eliquis








- Ischemic bowel, status post surgical resection


Improvement





-Acute postprocedure blood loss anemia expected from surgery: 


IV Ferrlecit 2 doses.  Follow H&H.  Add ferrous sulfate





-Major cognitive impairment likely from Alzheimer's dementia





-Obesity BMI 39.5








-Primary osteoarthritis multiple joints bilaterally


Voltaren gel 2 g topical 4 times a day, Tylenol 3 when necessary





-Essential hypertension


Lopressor 25 mg twice a day.  Norvasc 5 mg twice a day





-Hyperlipidemia


Pravachol 20 mg daily at bedtime

## 2022-04-22 VITALS
TEMPERATURE: 98.2 F | RESPIRATION RATE: 18 BRPM | DIASTOLIC BLOOD PRESSURE: 54 MMHG | SYSTOLIC BLOOD PRESSURE: 103 MMHG | HEART RATE: 52 BPM

## 2022-04-22 LAB
ANION GAP SERPL CALC-SCNC: 8.2 MMOL/L (ref 10–18)
BASOPHILS # BLD AUTO: 0.04 X 10*3/UL (ref 0–0.1)
BASOPHILS NFR BLD AUTO: 0.4 %
BUN SERPL-SCNC: 4.6 MG/DL (ref 9–27)
BUN/CREAT SERPL: 6.57 RATIO (ref 12–20)
CALCIUM SPEC-MCNC: 8 MG/DL (ref 8.7–10.3)
CHLORIDE SERPL-SCNC: 108 MMOL/L (ref 96–109)
CO2 SERPL-SCNC: 24.8 MMOL/L (ref 20–27.5)
EOSINOPHIL # BLD AUTO: 0.5 X 10*3/UL (ref 0.04–0.35)
EOSINOPHIL NFR BLD AUTO: 4.8 %
ERYTHROCYTE [DISTWIDTH] IN BLOOD BY AUTOMATED COUNT: 2.42 X 10*6/UL (ref 4.1–5.2)
ERYTHROCYTE [DISTWIDTH] IN BLOOD: 15.7 % (ref 11.5–14.5)
GLUCOSE BLD-MCNC: 140 MG/DL (ref 75–99)
GLUCOSE SERPL-MCNC: 87 MG/DL (ref 70–110)
HCT VFR BLD AUTO: 23.6 % (ref 37.2–46.3)
HGB BLD-MCNC: 7.2 G/DL (ref 12–15)
IMM GRANULOCYTES BLD QL AUTO: 1.1 %
LYMPHOCYTES # SPEC AUTO: 1.62 X 10*3/UL (ref 0.9–5)
LYMPHOCYTES NFR SPEC AUTO: 15.5 %
MAGNESIUM SPEC-SCNC: 2.2 MG/DL (ref 1.5–2.4)
MCH RBC QN AUTO: 29.8 PG (ref 27–32)
MCHC RBC AUTO-ENTMCNC: 30.5 G/DL (ref 32–37)
MCV RBC AUTO: 97.5 FL (ref 80–97)
MONOCYTES # BLD AUTO: 0.76 X 10*3/UL (ref 0.2–1)
MONOCYTES NFR BLD AUTO: 7.3 %
NEUTROPHILS # BLD AUTO: 7.43 X 10*3/UL (ref 1.8–7.7)
NEUTROPHILS NFR BLD AUTO: 70.9 %
NRBC BLD AUTO-RTO: 0 /100 WBCS (ref 0–0)
PLATELET # BLD AUTO: 426 X 10*3/UL (ref 140–440)
POTASSIUM SERPL-SCNC: 3.5 MMOL/L (ref 3.5–5.5)
SODIUM SERPL-SCNC: 141 MMOL/L (ref 135–145)
WBC # BLD AUTO: 10.47 X 10*3/UL (ref 4.5–10)

## 2022-04-22 RX ADMIN — METOPROLOL TARTRATE SCH MG: 50 TABLET, FILM COATED ORAL at 07:21

## 2022-04-22 RX ADMIN — Medication SCH GM: at 07:22

## 2022-04-22 RX ADMIN — MEMANTINE HYDROCHLORIDE SCH MG: 10 TABLET ORAL at 07:21

## 2022-04-22 RX ADMIN — Medication SCH GM: at 12:21

## 2022-04-22 RX ADMIN — ACETAMINOPHEN SCH MG: 325 TABLET, FILM COATED ORAL at 07:22

## 2022-04-22 RX ADMIN — PIPERACILLIN AND TAZOBACTAM SCH MLS/HR: 3; .375 INJECTION, POWDER, FOR SOLUTION INTRAVENOUS at 07:23

## 2022-04-22 RX ADMIN — Medication SCH MG: at 07:22

## 2022-04-22 RX ADMIN — PIPERACILLIN AND TAZOBACTAM SCH: 3; .375 INJECTION, POWDER, FOR SOLUTION INTRAVENOUS at 15:50

## 2022-04-22 RX ADMIN — LOSARTAN POTASSIUM SCH MG: 50 TABLET, FILM COATED ORAL at 07:22

## 2022-04-22 RX ADMIN — APIXABAN SCH MG: 5 TABLET, FILM COATED ORAL at 07:21

## 2022-04-22 NOTE — P.CONS
History of Present Illness





- Reason for Consult


Consult date: 04/21/22


Antibiotic management


Requesting physician: Benedicto E Sheet





- Chief Complaint


Abdominal pain x few days





- History of Present Illness


Patient is a 71-year-old -American female who presented to the ER about 

10 days ago on 4/13/2022 complaining of abdominal pain patient did have a CT of 

abdominal pelvis which did shows persistent focal irritation of small bowel 

loops in the lower abdominal patient was evaluated by general surgery and the 

patient did have a exploratory laparotomy noticed to have strangulated internal 

hernia with a small bowel obstruction status post small bowel resection and 

repair of incisional hernia no mention of any perforation patient did not have 

any culture done during this admission, patient did have a low-grade fever on 

4/14/2020 to 4/15/2022 as well as 4/17/2022 however no fever since then patient 

did have elevated white count on admission however those has normalized as of 

today's however the patient did have a procalcitonin drawn which was 15.80 that 

has prompted this infectious disease consultation patient is currently being t

reated with Zosyn since 14 April 2022, patient currently denies having any 

headache or URI symptoms denies having any chest pain shortness of breath or 

cough and is currently satting 100% on room air she did have some dull aching 

abdominal pain admitted abdominal pain overall is improving patient did have 

slight dehiscence of the upper end of incision but no purulent drainage denies 

any nausea or vomiting and no diarrhea








Review of Systems


Positive point has been  mentioned in the HPI rest of the systems are negative








Past Medical History


Past Medical History: COPD, Hyperlipidemia, Hypertension


Additional Past Medical History / Comment(s): Diverticular disease, 

constipation, chronic anemia, PVD/venous insufficiency, lower extremity 

edema/discoloration, arthritis in multiple joints, gout.


History of Any Multi-Drug Resistant Organisms: None Reported


Past Surgical History: Hysterectomy


Additional Past Surgical History / Comment(s): Bilateral breast reductions, 

colonoscopy.


Past Anesthesia/Blood Transfusion Reactions: No Reported Reaction


Past Psychological History: No Psychological Hx Reported


Smoking Status: Former smoker


Past Alcohol Use History: None Reported


Past Drug Use History: None Reported





- Past Family History


  ** Mother


History Unknown: Yes


Additional Family Medical History / Comment(s): Pt was adopted.





  ** Father


History Unknown: Yes


Additional Family Medical History / Comment(s): Pt was adopted.





Medications and Allergies


                                Home Medications











 Medication  Instructions  Recorded  Confirmed  Type


 


Acetaminophen Tab [Tylenol] 650 mg PO BID@0800,1700 04/13/22 04/13/22 History


 


Acetaminophen [Tylenol 8 Hour] 650 mg PO Q4H PRN 04/13/22 04/13/22 History


 


Acetaminophen-Codeine 300-30mg 1 tab PO Q12H PRN 04/13/22 04/13/22 History





[Tylenol w/codeine #3]    


 


Aspirin 325 mg PO DAILY@1700 04/13/22 04/13/22 History


 


Cyanocobalamin [Vitamin B-12] 500 mcg PO DAILY@1700 04/13/22 04/13/22 History


 


Diclofenac Sodium Gel [Voltaren 2 gm TOPICAL QID 04/13/22 04/13/22 History





Gel]    


 


Ergocalciferol [Vitamin D2 (1250 1,250 mcg PO SANTORO@1700 04/13/22 04/13/22 History





Mcg = 71376 Iu)]    


 


Folic Acid 1 mg PO DAILY@1700 04/13/22 04/13/22 History


 


Furosemide [Lasix] 60 mg PO DAILY@1700 04/13/22 04/13/22 History


 


Magnesium Hydroxide [Milk of 7,200 mg PO DAILY PRN 04/13/22 04/13/22 History





Magnesia Concentrate]    


 


Memantine [Namenda] 10 mg PO BID@0800,1700 04/13/22 04/13/22 History


 


Metoprolol Tartrate [Lopressor] 25 mg PO BID@0800,1700 04/13/22 04/13/22 History


 


Na Phos,M-B/Na Phos,Di-Ba [Fleet 133 ml RECTAL DAILY PRN 04/13/22 04/13/22 

History





Adult]    


 


Potassium Chloride [Klor-Con 10 ER] 10 meq PO DAILY@0800 04/13/22 04/13/22 

History


 


Pravastatin Sodium [Pravachol] 20 mg PO HS@2100 04/13/22 04/13/22 History


 


Ubidecarenone [Co Q-10] 100 mg PO DAILY@1700 04/13/22 04/13/22 History


 


bisacodyL [Dulcolax] 10 mg RECTAL DAILY PRN 04/13/22 04/13/22 History








                                    Allergies











Allergy/AdvReac Type Severity Reaction Status Date / Time


 


doxycycline [From Vibramycin] Allergy  Unknown Verified 04/13/22 06:25


 


niacin Allergy  Unknown Verified 04/13/22 06:25


 


simvastatin [From Zocor] Allergy  Unknown Verified 04/13/22 06:25














Physical Exam


Vitals: 


                                   Vital Signs











  Temp Pulse Resp BP Pulse Ox


 


 04/21/22 13:58  97.8 F  68  18  120/74  100


 


 04/21/22 07:40  98.8 F  77  18  122/64  95


 


 04/21/22 07:37   77  18  


 


 04/21/22 01:39  98.2 F  71  17  130/70  95


 


 04/20/22 20:00  98.3 F  69  18  146/83  100








                                Intake and Output











 04/20/22 04/21/22 04/21/22





 22:59 06:59 14:59


 


Output Total 1100  1200


 


Balance -1100  -1200


 


Output:   


 


  Urine 1100  1200


 


Other:   


 


  Voiding Method   External Catheter


 


  # Voids 1 3 


 


  # Bowel Movements 1  


 


  Weight   120.8 kg











GENERAL DESCRIPTION: An elderly female lying in bed, no distress. No tachypnea 

or accessory muscle of respiration use.


HEENT: Shows Pallor , no scleral icterus. Oral mucous membrane is dry. No 

pharyngeal erythema or thrush


NECK: Trachea central, no thyromegaly.


LUNGS: Unlabored breathing.  Decreased present at the base. No wheeze or 

crackle.


HEART: S1, S2, regular rate and rhythm. No loud murmur


ABDOMEN: Soft, midline incision small opening at the upper end otherwise staples

were intact no redness no drainage


EXTREMITIES: No edema of feet.


SKIN: No rash, no masses palpable.


NEUROLOGICAL: The patient is awake, alert, oriented x3, mood and affect normal.

















Results


CBC & Chem 7: 


                                 04/21/22 05:44





                                 04/21/22 05:44


Labs: 


                  Abnormal Lab Results - Last 24 Hours (Table)











  04/21/22 04/21/22 04/21/22 Range/Units





  05:44 05:44 05:44 


 


RBC   2.58 L   (4.10-5.20)  X 10*6/uL


 


Hgb   7.8 L   (12.0-15.0)  g/dL


 


Hct   25.4 L   (37.2-46.3)  %


 


MCV   98.4 H   (80.0-97.0)  fL


 


MCHC   30.7 L   (32.0-37.0)  g/dL


 


RDW   15.5 H   (11.5-14.5)  %


 


Immature Gran #   0.17 H   (0.00-0.04)  X 10*3/uL


 


Eosinophils #   0.54 H   (0.04-0.35)  X 10*3/uL


 


Potassium    3.1 L  (3.5-5.5)  mmol/L


 


BUN    3.9 L  (9.0-27.0)  mg/dL


 


BUN/Creatinine Ratio    6.50 L  (12.00-20.00)  Ratio


 


Calcium    8.0 L  (8.7-10.3)  mg/dL


 


Magnesium    1.4 L  (1.5-2.4)  mg/dL


 


Procalcitonin  15.80 H    (0.02-0.09)  ng/mL














Assessment and Plan


(1) Elevated procalcitonin


Current Visit: Yes   Status: Acute   Code(s): R79.89 - OTHER SPECIFIED ABNORMAL 

FINDINGS OF BLOOD CHEMISTRY   SNOMED Code(s): 987412863


   


Plan: 


1patient presented to hospital with abdominal pain and has been diagnosed with 

strangulated hernia s/p small bowel resection and repair of the incisional 

hernia in this patient seem to have shown overall clinical improvement as the 

patient is afebrile and white count has normalized she did have elevated 

procalcitonin which is more specific for bacterial pneumonia however the patient

currently do not have any respiratory symptoms and is satting 100% on room air.


2continue the patient on Zosyn 3.375 g every 8 hours however if the patient 

continues to improve and oral intake improves to switch her to oral antibiotic 

on discharge.


We will follow on clinical condition and cultures to further adjust medication 

if needed


Thank you for this consultation will follow this patient along with you





Time with Patient: Greater than 30

## 2022-04-22 NOTE — P.DS
Providers


Date of admission: 


04/13/22 06:13





Attending physician: 


Ty Francois





Consults: 





                                        





04/13/22 06:15


Consult Physician Routine 


   Consulting Provider: Amaury Rodney


   Consult Reason/Comments: New onset atrial fibrillation


   Do you want consulting provider notified?: Yes


Consult Physician Routine 


   Consulting Provider: Melquiades Vela


   Consult Reason/Comments: abdominal pain


   Do you want consulting provider notified?: Yes





04/13/22 20:50


Consult Physician Routine 


   Consulting Provider: Antoine Beltre


   Consult Reason/Comments: Intensive care and medical management


   Do you want consulting provider notified?: Yes





04/21/22 11:45


Consult Physician Urgent 


   Consulting Provider: Aly Chadwick


   Consult Reason/Comments: antibiotic management


   Do you want consulting provider notified?: Yes











Primary care physician: 


Zaid Pandya





[place] [facility] Course: 





Diagnoses:


Acute sound related to internal hernia with small bowel obstruction, status post

exploratory laparotomy and small bowel resection with hernia repair on 4/15


Ischemic bowel, secondary to above.  Status post bowel resection.  Pathology 

showing bowel ischemia


Intra-abdominal infection


Paroxysmal atrial fibrillation, currently rate-controlled and patient is on a 

Eliquis


Acute procedure blood loss anemia may expected, hemoglobin stable.


Major cognitive impairment, likely from Alzheimer dementia


obesity BMI 39.5


Primary osteoarthritis multiple joints bilaterally


Hyperlipidemia





Hospital course:


This is a 71-year-old patient, follows with Dr. Pandya at [facility].  

Chronic stable medical conditions include COPD, hypertension, hyperlipidemia, 

dementia.  Patient presents with abdominal pain, found to have some dilated 

hernia she underwent expiratory left colectomy and bowel resection with hernia 

repair with Dr. Hernandez on 4/15.  Postoperatively doing well, today she is 

sitting in chair, fully awake and oriented, tolerates diet well, no abdominal 

pain, she has bowel movement and she is cleared for surgery by discharge back to

[facility] where she came in from.


Also she has evidence of intra-abdominal infection, pro-calcitonin is improving

while she is on Zosyn, I discussed the case with our infectious disease and when

going to discharge the patient on Augmentin for short oral cause with close 

outpatient follow-up.


Other that she denies any other symptoms, no chest pain or dyspnea.  No urine 

complaints.  No fever.


Patient agrees she can go to [facility] today.


Problems and management plan were discussed with the patient and he verbalized 

understanding and acceptance


Patient was found stable and can be discharged home however he needs follow-up 

as an outpatient. Patient was instructed to follow up with PCP Dr. Pandya 

within one week and patient agrees


Patient was instructed to follow up with Dr. Marci juarez in one week and she agree

s 





Physical exam


Gen: patient is a AAOx3, no distress


CVS: S1-S2, RRR, no murmur


Lungs: B/L CTA, no wheezing


Abdomen: soft, no distention, no tenderness, positive bowel sounds


Extremity: no leg edema or induration





Time spent more than 35 minutes





Plan - Discharge Summary


Discharge Rx Participant: No


New Discharge Prescriptions: 


No Action


   Memantine [Namenda] 10 mg PO BID@0800,1700


   Potassium Chloride [Klor-Con 10 ER] 10 meq PO DAILY@0800


   Ubidecarenone [Co Q-10] 100 mg PO DAILY@1700


   Ergocalciferol [Vitamin D2 (1250 Mcg = 37137 Iu)] 1,250 mcg PO SANTORO@1700


   Aspirin 325 mg PO DAILY@1700


   Diclofenac Sodium Gel [Voltaren Gel] 2 gm TOPICAL QID


   bisacodyL [Dulcolax] 10 mg RECTAL DAILY PRN


     PRN Reason: Constipation


   Acetaminophen-Codeine 300-30mg [Tylenol w/codeine #3] 1 tab PO Q12H PRN


     PRN Reason: Pain


   Acetaminophen [Tylenol 8 Hour] 650 mg PO Q4H PRN


     PRN Reason: GENERAL DISCOMFORT


   Na Phos,M-B/Na Phos,Di-Ba [Fleet Adult] 133 ml RECTAL DAILY PRN


     PRN Reason: Constipation


   Magnesium Hydroxide [Milk of Magnesia Concentrate] 7,200 mg PO DAILY PRN


     PRN Reason: Constipation


   Metoprolol Tartrate [Lopressor] 25 mg PO BID@0800,1700


   Acetaminophen Tab [Tylenol] 650 mg PO BID@0800,1700


   Cyanocobalamin [Vitamin B-12] 500 mcg PO DAILY@1700


   Pravastatin Sodium [Pravachol] 20 mg PO HS@2100


   Furosemide [Lasix] 60 mg PO DAILY@1700


   Folic Acid 1 mg PO DAILY@1700


Discharge Medication List





Acetaminophen Tab [Tylenol] 650 mg PO BID@0800,1700 04/13/22 [History]


Acetaminophen [Tylenol 8 Hour] 650 mg PO Q4H PRN 04/13/22 [History]


Acetaminophen-Codeine 300-30mg [Tylenol w/codeine #3] 1 tab PO Q12H PRN 04/13/22

[History]


Aspirin 325 mg PO DAILY@1700 04/13/22 [History]


Cyanocobalamin [Vitamin B-12] 500 mcg PO DAILY@1700 04/13/22 [History]


Diclofenac Sodium Gel [Voltaren Gel] 2 gm TOPICAL QID 04/13/22 [History]


Ergocalciferol [Vitamin D2 (1250 Mcg = 46560 Iu)] 1,250 mcg PO SANTORO@1700 04/13/22 

[History]


Folic Acid 1 mg PO DAILY@1700 04/13/22 [History]


Furosemide [Lasix] 60 mg PO DAILY@1700 04/13/22 [History]


Magnesium Hydroxide [Milk of Magnesia Concentrate] 7,200 mg PO DAILY PRN 

04/13/22 [History]


Memantine [Namenda] 10 mg PO BID@0800,1700 04/13/22 [History]


Metoprolol Tartrate [Lopressor] 25 mg PO BID@0800,1700 04/13/22 [History]


Na Phos,M-B/Na Phos,Di-Ba [Fleet Adult] 133 ml RECTAL DAILY PRN 04/13/22 

[History]


Potassium Chloride [Klor-Con 10 ER] 10 meq PO DAILY@0800 04/13/22 [History]


Pravastatin Sodium [Pravachol] 20 mg PO HS@2100 04/13/22 [History]


Ubidecarenone [Co Q-10] 100 mg PO DAILY@1700 04/13/22 [History]


bisacodyL [Dulcolax] 10 mg RECTAL DAILY PRN 04/13/22 [History]








Follow up Appointment(s)/Referral(s): 


Juanis Castellanos MD [STAFF PHYSICIAN] - 2 Weeks


Zaid Pandya DO [Primary Care Provider] - 1-2 days


Wesley Fitzgerald [NON-STAFF] - As Needed


Melquiades Vela MD [STAFF PHYSICIAN] - 1 Week


Activity/Diet/Wound Care/Special Instructions: 


No lifting over 10 pounds


Shower daily. No soaking or tub baths for 2 weeks


Very light activity until you are reevaluated at your follow up appointment with

your surgeon

## 2022-04-22 NOTE — P.PN
Subjective


Progress Note Date: 04/22/22





CHIEF COMPLAINT: Small bowel obstruction





HISTORY OF PRESENT ILLNESS: Patient is postop day #9 status post exploratory 

laparotomy, small bowel resection and repair of incisional hernia for 

strangulated internal hernia with small bowel obstruction.  Patient is lying in 

bed.  Patient has been having bowel movements.  She is tolerating full liquid 

diet.  She denies any nausea or vomiting.  She denies any abdominal pain at this

time.  She is afebrile.  WBC 10.47 hemoglobin 7.2 platelets 426 sodium 141 pot

assium 3.5 creatinine 0.7 magnesium 2.2





Patient seen and examined with Dr. diaz





PHYSICAL EXAM: 


VITAL SIGNS: Reviewed.


GENERAL: Well-developed in no acute distress. 


HEENT:  No sclera icterus. Extraocular movements grossly intact.  Moist buccal 

mucosa. Head is atraumatic, normocephalic. 


ABDOMEN:  Soft.  Nondistended. Nontender.  Incision site with 4 fermin.   

Incision site clean, dry and intact 


NEUROLOGIC: Patient is awake and alert with confusion





ASSESSMENT: 


1.  Strangulated internal hernia with small bowel obstruction status post 

laparotomy, small bowel resection and repair of incisional hernia


2.  Atrial fibrillation


3.  Hypomagnesemia and hypokalemia





PLAN: 


-Patient can be discharged from surgical standpoint when medically cleared


-Advance to carb consistent


-Replace potassium


-Continue local wound care


-Antibiotics per ID service


-Patient to return to [facility] after discharge


-Encouraged patient to increase activity level


-Incentive spirometer ordered


-Continue supportive care


-Continue pain medication as needed


-DVT prophylaxis Eliquis





Physician Assistant note has been reviewed by physician. Signing provider agrees

with the documented findings, assessment, and plan of care. 





Objective





- Vital Signs


Vital signs: 


                                   Vital Signs











Temp  98.5 F   04/22/22 08:00


 


Pulse  66   04/22/22 08:00


 


Resp  16   04/22/22 08:00


 


BP  124/70   04/22/22 08:00


 


Pulse Ox  99   04/22/22 08:00








                                 Intake & Output











 04/21/22 04/22/22 04/22/22





 18:59 06:59 18:59


 


Intake Total 800  


 


Output Total 1700  


 


Balance -900  


 


Weight 120.8 kg  


 


Intake:   


 


  Oral 800  


 


Output:   


 


  Urine 1700  


 


Other:   


 


  Voiding Method External Catheter  External Catheter


 


  # Bowel Movements  1 














- Labs


CBC & Chem 7: 


                                 04/22/22 05:44





                                 04/22/22 05:44


Labs: 


                  Abnormal Lab Results - Last 24 Hours (Table)











  04/22/22 04/22/22 04/22/22 Range/Units





  05:44 05:44 05:44 


 


WBC   10.47 H   (4.50-10.00)  X 10*3/uL


 


RBC   2.42 L   (4.10-5.20)  X 10*6/uL


 


Hgb   7.2 L   (12.0-15.0)  g/dL


 


Hct   23.6 L   (37.2-46.3)  %


 


MCV   97.5 H   (80.0-97.0)  fL


 


MCHC   30.5 L   (32.0-37.0)  g/dL


 


RDW   15.7 H   (11.5-14.5)  %


 


Immature Gran #   0.12 H   (0.00-0.04)  X 10*3/uL


 


Eosinophils #   0.50 H   (0.04-0.35)  X 10*3/uL


 


Anion Gap  8.20 L    (10.00-18.00)  mmol/L


 


BUN  4.6 L    (9.0-27.0)  mg/dL


 


BUN/Creatinine Ratio  6.57 L    (12.00-20.00)  Ratio


 


POC Glucose (mg/dL)     (75-99)  mg/dL


 


Calcium  8.0 L    (8.7-10.3)  mg/dL


 


Procalcitonin    7.44 H  (0.02-0.09)  ng/mL














  04/22/22 Range/Units





  10:23 


 


WBC   (4.50-10.00)  X 10*3/uL


 


RBC   (4.10-5.20)  X 10*6/uL


 


Hgb   (12.0-15.0)  g/dL


 


Hct   (37.2-46.3)  %


 


MCV   (80.0-97.0)  fL


 


MCHC   (32.0-37.0)  g/dL


 


RDW   (11.5-14.5)  %


 


Immature Gran #   (0.00-0.04)  X 10*3/uL


 


Eosinophils #   (0.04-0.35)  X 10*3/uL


 


Anion Gap   (10.00-18.00)  mmol/L


 


BUN   (9.0-27.0)  mg/dL


 


BUN/Creatinine Ratio   (12.00-20.00)  Ratio


 


POC Glucose (mg/dL)  140 H  (75-99)  mg/dL


 


Calcium   (8.7-10.3)  mg/dL


 


Procalcitonin   (0.02-0.09)  ng/mL

## 2022-04-22 NOTE — P.PN
Subjective


Progress Note Date: 04/22/22


Principal diagnosis: 


Abdominal infection





Patient is a 71-year-old  female presented to the hospital with 

abdominal pain secondary to small bowel obstruction from strangulated internal 

hernia, status post laparotomy release of hernia and small bowel resection.


On today's evaluation that is 04/22/2022, the patient denies having any fever or

any chills she is breathing comfortably on room air denies having any chest pain

or cough abdominal pain is currently controlled his been tolerating her diet and

no diarrhea








Objective





- Vital Signs


Vital signs: 


                                   Vital Signs











Temp  98.5 F   04/22/22 08:00


 


Pulse  66   04/22/22 08:00


 


Resp  16   04/22/22 08:00


 


BP  124/70   04/22/22 08:00


 


Pulse Ox  99   04/22/22 08:00








                                 Intake & Output











 04/21/22 04/22/22 04/22/22





 18:59 06:59 18:59


 


Intake Total 800  


 


Output Total 1700  


 


Balance -900  


 


Weight 120.8 kg  


 


Intake:   


 


  Oral 800  


 


Output:   


 


  Urine 1700  


 


Other:   


 


  Voiding Method External Catheter  External Catheter


 


  # Bowel Movements  1 














- Exam


GENERAL DESCRIPTION: An elderly female lying in bed in no distress





RESPIRATORY SYSTEM: Unlabored breathing , decreased breath sounds at bases





HEART: S1 S2 regular rate and rhythm ,





ABDOMEN: Soft , no tenderness





EXTREMITIES: No edema feet








- Labs


CBC & Chem 7: 


                                 04/22/22 05:44





                                 04/22/22 05:44


Labs: 


                  Abnormal Lab Results - Last 24 Hours (Table)











  04/22/22 04/22/22 04/22/22 Range/Units





  05:44 05:44 05:44 


 


WBC   10.47 H   (4.50-10.00)  X 10*3/uL


 


RBC   2.42 L   (4.10-5.20)  X 10*6/uL


 


Hgb   7.2 L   (12.0-15.0)  g/dL


 


Hct   23.6 L   (37.2-46.3)  %


 


MCV   97.5 H   (80.0-97.0)  fL


 


MCHC   30.5 L   (32.0-37.0)  g/dL


 


RDW   15.7 H   (11.5-14.5)  %


 


Immature Gran #   0.12 H   (0.00-0.04)  X 10*3/uL


 


Eosinophils #   0.50 H   (0.04-0.35)  X 10*3/uL


 


Anion Gap  8.20 L    (10.00-18.00)  mmol/L


 


BUN  4.6 L    (9.0-27.0)  mg/dL


 


BUN/Creatinine Ratio  6.57 L    (12.00-20.00)  Ratio


 


POC Glucose (mg/dL)     (75-99)  mg/dL


 


Calcium  8.0 L    (8.7-10.3)  mg/dL


 


Procalcitonin    7.44 H  (0.02-0.09)  ng/mL














  04/22/22 Range/Units





  10:23 


 


WBC   (4.50-10.00)  X 10*3/uL


 


RBC   (4.10-5.20)  X 10*6/uL


 


Hgb   (12.0-15.0)  g/dL


 


Hct   (37.2-46.3)  %


 


MCV   (80.0-97.0)  fL


 


MCHC   (32.0-37.0)  g/dL


 


RDW   (11.5-14.5)  %


 


Immature Gran #   (0.00-0.04)  X 10*3/uL


 


Eosinophils #   (0.04-0.35)  X 10*3/uL


 


Anion Gap   (10.00-18.00)  mmol/L


 


BUN   (9.0-27.0)  mg/dL


 


BUN/Creatinine Ratio   (12.00-20.00)  Ratio


 


POC Glucose (mg/dL)  140 H  (75-99)  mg/dL


 


Calcium   (8.7-10.3)  mg/dL


 


Procalcitonin   (0.02-0.09)  ng/mL














Assessment and Plan


(1) Elevated procalcitonin


Status: Acute   Code(s): R79.89 - OTHER SPECIFIED ABNORMAL FINDINGS OF BLOOD 

CHEMISTRY   SNOMED Code(s): 756923505


   


Plan: 


1patient presented to hospital with abdominal pain and has been diagnosed with 

strangulated hernia s/p small bowel resection and repair of the incisional 

hernia in this patient seem to have shown overall clinical improvement as the 

patient is afebrile and white count has normalized she did have elevated 

procalcitonin which is more specific for bacterial pneumonia however the patient

currently do not have any respiratory symptoms and is satting 100% on room air.


2patient has shown clinical improvement with Zosyn 3.375 g every 8 hours and 

has received more than 10 days of IV antibiotic therapy with no fever white 

count normal may consider short course of oral Augmentin on discharge discussed 

with the admitting physician





Time with Patient: Less than 30